# Patient Record
Sex: FEMALE | Race: BLACK OR AFRICAN AMERICAN | NOT HISPANIC OR LATINO | Employment: OTHER | URBAN - METROPOLITAN AREA
[De-identification: names, ages, dates, MRNs, and addresses within clinical notes are randomized per-mention and may not be internally consistent; named-entity substitution may affect disease eponyms.]

---

## 2017-02-22 ENCOUNTER — HISTORICAL (OUTPATIENT)
Dept: RADIOLOGY | Facility: HOSPITAL | Age: 62
End: 2017-02-22

## 2017-06-16 ENCOUNTER — HISTORICAL (OUTPATIENT)
Dept: RADIOLOGY | Facility: HOSPITAL | Age: 62
End: 2017-06-16

## 2017-09-05 ENCOUNTER — HISTORICAL (OUTPATIENT)
Dept: LAB | Facility: HOSPITAL | Age: 62
End: 2017-09-05

## 2017-09-05 LAB
ABS NEUT (OLG): 2.5 X10(3)/MCL (ref 1.5–6.9)
ALBUMIN SERPL-MCNC: 3.6 GM/DL (ref 3.4–5)
ALBUMIN/GLOB SERPL: 0.9 RATIO
ALP SERPL-CCNC: 86 UNIT/L (ref 30–113)
ALT SERPL-CCNC: 34 UNIT/L (ref 10–45)
AST SERPL-CCNC: 26 UNIT/L (ref 15–37)
BILIRUB SERPL-MCNC: 0.7 MG/DL (ref 0.1–0.9)
BILIRUBIN DIRECT+TOT PNL SERPL-MCNC: 0.2 MG/DL (ref 0–0.3)
BILIRUBIN DIRECT+TOT PNL SERPL-MCNC: 0.5 MG/DL
BUN SERPL-MCNC: 28 MG/DL (ref 10–20)
CALCIUM SERPL-MCNC: 9.4 MG/DL (ref 8–10.5)
CHLORIDE SERPL-SCNC: 106 MMOL/L (ref 100–108)
CO2 SERPL-SCNC: 31 MMOL/L (ref 21–35)
CREAT SERPL-MCNC: 1.56 MG/DL (ref 0.7–1.3)
ERYTHROCYTE [DISTWIDTH] IN BLOOD BY AUTOMATED COUNT: 14.6 % (ref 11.5–17)
GLOBULIN SER-MCNC: 4 GM/DL
GLUCOSE SERPL-MCNC: 126 MG/DL (ref 75–116)
HCT VFR BLD AUTO: 38 % (ref 36–48)
HGB BLD-MCNC: 11.8 GM/DL (ref 12–16)
INR PPP: 1 (ref 0–1.2)
MAGNESIUM SERPL-MCNC: 2.3 MG/DL (ref 1.8–2.4)
MCH RBC QN AUTO: 28 PG (ref 27–34)
MCHC RBC AUTO-ENTMCNC: 31 GM/DL (ref 31–36)
MCV RBC AUTO: 91 FL (ref 80–99)
PHOSPHATE SERPL-MCNC: 3 MG/DL (ref 2.6–4.7)
PLATELET # BLD AUTO: 251 X10(3)/MCL (ref 140–400)
PMV BLD AUTO: 9.9 FL (ref 6.8–10)
POTASSIUM SERPL-SCNC: 3.2 MMOL/L (ref 3.6–5.2)
PROT SERPL-MCNC: 7.6 GM/DL (ref 6.4–8.2)
PROTHROMBIN TIME: 11.3 SECOND(S) (ref 9–12)
RBC # BLD AUTO: 4.16 X10(6)/MCL (ref 4.2–5.4)
SODIUM SERPL-SCNC: 144 MMOL/L (ref 135–145)
WBC # SPEC AUTO: 4.1 X10(3)/MCL (ref 4.5–11.5)

## 2017-09-12 ENCOUNTER — HISTORICAL (OUTPATIENT)
Dept: LAB | Facility: HOSPITAL | Age: 62
End: 2017-09-12

## 2017-09-12 LAB
HEMOCCULT SP1 STL QL: NEGATIVE
HEMOCCULT SP2 STL QL: NEGATIVE

## 2017-11-15 ENCOUNTER — HISTORICAL (OUTPATIENT)
Dept: LAB | Facility: HOSPITAL | Age: 62
End: 2017-11-15

## 2017-12-06 ENCOUNTER — HISTORICAL (OUTPATIENT)
Dept: RADIOLOGY | Facility: HOSPITAL | Age: 62
End: 2017-12-06

## 2017-12-11 ENCOUNTER — HISTORICAL (OUTPATIENT)
Dept: RADIOLOGY | Facility: HOSPITAL | Age: 62
End: 2017-12-11

## 2018-01-09 ENCOUNTER — HISTORICAL (OUTPATIENT)
Dept: LAB | Facility: HOSPITAL | Age: 63
End: 2018-01-09

## 2018-05-07 ENCOUNTER — HISTORICAL (OUTPATIENT)
Dept: RADIOLOGY | Facility: HOSPITAL | Age: 63
End: 2018-05-07

## 2018-05-16 ENCOUNTER — HISTORICAL (OUTPATIENT)
Dept: RADIOLOGY | Facility: HOSPITAL | Age: 63
End: 2018-05-16

## 2018-08-29 ENCOUNTER — HISTORICAL (OUTPATIENT)
Dept: LAB | Facility: HOSPITAL | Age: 63
End: 2018-08-29

## 2018-09-19 ENCOUNTER — HISTORICAL (OUTPATIENT)
Dept: RADIOLOGY | Facility: HOSPITAL | Age: 63
End: 2018-09-19

## 2018-12-19 ENCOUNTER — HISTORICAL (OUTPATIENT)
Dept: LAB | Facility: HOSPITAL | Age: 63
End: 2018-12-19

## 2019-02-26 ENCOUNTER — HISTORICAL (OUTPATIENT)
Dept: LAB | Facility: HOSPITAL | Age: 64
End: 2019-02-26

## 2019-03-15 ENCOUNTER — HISTORICAL (OUTPATIENT)
Dept: RADIOLOGY | Facility: HOSPITAL | Age: 64
End: 2019-03-15

## 2019-08-23 ENCOUNTER — HISTORICAL (OUTPATIENT)
Dept: LAB | Facility: HOSPITAL | Age: 64
End: 2019-08-23

## 2021-06-25 ENCOUNTER — HISTORICAL (OUTPATIENT)
Dept: LAB | Facility: HOSPITAL | Age: 66
End: 2021-06-25

## 2022-08-22 ENCOUNTER — HOSPITAL ENCOUNTER (EMERGENCY)
Facility: HOSPITAL | Age: 67
Discharge: HOME | End: 2022-08-22
Attending: INTERNAL MEDICINE
Payer: MEDICARE

## 2022-08-22 VITALS
HEART RATE: 81 BPM | TEMPERATURE: 98 F | SYSTOLIC BLOOD PRESSURE: 165 MMHG | WEIGHT: 250 LBS | RESPIRATION RATE: 18 BRPM | DIASTOLIC BLOOD PRESSURE: 78 MMHG | HEIGHT: 66 IN | OXYGEN SATURATION: 98 % | BODY MASS INDEX: 40.18 KG/M2

## 2022-08-22 DIAGNOSIS — S39.012A BACK STRAIN, INITIAL ENCOUNTER: ICD-10-CM
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
## 2022-08-22 PROCEDURE — 99284 EMERGENCY DEPT VISIT MOD MDM: CPT | Mod: 25

## 2022-08-22 PROCEDURE — 63600175 PHARM REV CODE 636 W HCPCS: Performed by: NURSE PRACTITIONER

## 2022-08-22 PROCEDURE — 25000003 PHARM REV CODE 250: Performed by: NURSE PRACTITIONER

## 2022-08-22 PROCEDURE — 96372 THER/PROPH/DIAG INJ SC/IM: CPT | Performed by: NURSE PRACTITIONER

## 2022-08-22 RX ORDER — CYCLOBENZAPRINE HCL 5 MG
5 TABLET ORAL
Status: COMPLETED | OUTPATIENT
Start: 2022-08-22 | End: 2022-08-22

## 2022-08-22 RX ORDER — CYCLOBENZAPRINE HCL 10 MG
10 TABLET ORAL 3 TIMES DAILY PRN
Qty: 15 TABLET | Refills: 0 | Status: SHIPPED | OUTPATIENT
Start: 2022-08-22 | End: 2022-08-27

## 2022-08-22 RX ORDER — KETOROLAC TROMETHAMINE 30 MG/ML
30 INJECTION, SOLUTION INTRAMUSCULAR; INTRAVENOUS
Status: COMPLETED | OUTPATIENT
Start: 2022-08-22 | End: 2022-08-22

## 2022-08-22 RX ORDER — KETOROLAC TROMETHAMINE 10 MG/1
10 TABLET, FILM COATED ORAL 3 TIMES DAILY
Qty: 15 TABLET | Refills: 0 | Status: SHIPPED | OUTPATIENT
Start: 2022-08-22 | End: 2022-08-27

## 2022-08-22 RX ADMIN — CYCLOBENZAPRINE HYDROCHLORIDE 5 MG: 5 TABLET, FILM COATED ORAL at 08:08

## 2022-08-22 RX ADMIN — KETOROLAC TROMETHAMINE 30 MG: 30 INJECTION, SOLUTION INTRAMUSCULAR; INTRAVENOUS at 08:08
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
## 2022-12-30 ENCOUNTER — LAB VISIT (OUTPATIENT)
Dept: LAB | Facility: HOSPITAL | Age: 67
End: 2022-12-30
Attending: FAMILY MEDICINE
Payer: COMMERCIAL

## 2022-12-30 DIAGNOSIS — E11.22 TYPE 2 DIABETES MELLITUS WITH END-STAGE RENAL DISEASE: Primary | ICD-10-CM

## 2022-12-30 DIAGNOSIS — N18.6 TYPE 2 DIABETES MELLITUS WITH END-STAGE RENAL DISEASE: Primary | ICD-10-CM

## 2022-12-30 LAB
ALBUMIN SERPL-MCNC: 3.8 G/DL (ref 3.4–4.8)
ALBUMIN/GLOB SERPL: 1.1 RATIO (ref 1.1–2)
ALP SERPL-CCNC: 100 UNIT/L (ref 40–150)
ALT SERPL-CCNC: 23 UNIT/L (ref 0–55)
AST SERPL-CCNC: 16 UNIT/L (ref 5–34)
BILIRUBIN DIRECT+TOT PNL SERPL-MCNC: 0.9 MG/DL
BUN SERPL-MCNC: 21 MG/DL (ref 9.8–20.1)
CALCIUM SERPL-MCNC: 9.8 MG/DL (ref 8.4–10.2)
CHLORIDE SERPL-SCNC: 107 MMOL/L (ref 98–107)
CHOLEST SERPL-MCNC: 174 MG/DL
CHOLEST/HDLC SERPL: 3 {RATIO} (ref 0–5)
CO2 SERPL-SCNC: 25 MMOL/L (ref 23–31)
CREAT SERPL-MCNC: 1.25 MG/DL (ref 0.55–1.02)
CREAT UR-MCNC: 125.3 MG/DL (ref 47–110)
EST. AVERAGE GLUCOSE BLD GHB EST-MCNC: 182.9 MG/DL
GFR SERPLBLD CREATININE-BSD FMLA CKD-EPI: 48 MLS/MIN/1.73/M2
GLOBULIN SER-MCNC: 3.6 GM/DL (ref 2.4–3.5)
GLUCOSE SERPL-MCNC: 286 MG/DL (ref 82–115)
HBA1C MFR BLD: 8 %
HDLC SERPL-MCNC: 56 MG/DL (ref 35–60)
LDLC SERPL CALC-MCNC: 96 MG/DL (ref 50–140)
MICROALBUMIN UR-MCNC: 45.9 UG/ML
MICROALBUMIN/CREAT RATIO PNL UR: 36.6 MG/GM CR (ref 0–30)
POTASSIUM SERPL-SCNC: 3.7 MMOL/L (ref 3.5–5.1)
PROT SERPL-MCNC: 7.4 GM/DL (ref 5.8–7.6)
SODIUM SERPL-SCNC: 140 MMOL/L (ref 136–145)
TRIGL SERPL-MCNC: 109 MG/DL (ref 37–140)
VLDLC SERPL CALC-MCNC: 22 MG/DL

## 2022-12-30 PROCEDURE — 80053 COMPREHEN METABOLIC PANEL: CPT

## 2022-12-30 PROCEDURE — 83036 HEMOGLOBIN GLYCOSYLATED A1C: CPT

## 2022-12-30 PROCEDURE — 36415 COLL VENOUS BLD VENIPUNCTURE: CPT

## 2022-12-30 PROCEDURE — 82043 UR ALBUMIN QUANTITATIVE: CPT

## 2022-12-30 PROCEDURE — 80061 LIPID PANEL: CPT

## 2023-01-10 ENCOUNTER — HOSPITAL ENCOUNTER (OUTPATIENT)
Dept: RADIOLOGY | Facility: HOSPITAL | Age: 68
Discharge: HOME | End: 2023-01-10
Attending: FAMILY MEDICINE
Payer: COMMERCIAL

## 2023-01-10 DIAGNOSIS — Z12.31 ENCOUNTER FOR SCREENING MAMMOGRAM FOR BREAST CANCER: ICD-10-CM

## 2023-01-10 PROCEDURE — 77067 MAMMO DIGITAL SCREENING BILAT WITH TOMO: ICD-10-PCS | Mod: 26,,, | Performed by: STUDENT IN AN ORGANIZED HEALTH CARE EDUCATION/TRAINING PROGRAM

## 2023-01-10 PROCEDURE — 77063 MAMMO DIGITAL SCREENING BILAT WITH TOMO: ICD-10-PCS | Mod: 26,,, | Performed by: STUDENT IN AN ORGANIZED HEALTH CARE EDUCATION/TRAINING PROGRAM

## 2023-01-10 PROCEDURE — 77067 SCR MAMMO BI INCL CAD: CPT | Mod: TC

## 2023-01-10 PROCEDURE — 77067 SCR MAMMO BI INCL CAD: CPT | Mod: 26,,, | Performed by: STUDENT IN AN ORGANIZED HEALTH CARE EDUCATION/TRAINING PROGRAM

## 2023-01-10 PROCEDURE — 77063 BREAST TOMOSYNTHESIS BI: CPT | Mod: 26,,, | Performed by: STUDENT IN AN ORGANIZED HEALTH CARE EDUCATION/TRAINING PROGRAM

## 2023-01-17 ENCOUNTER — TELEPHONE (OUTPATIENT)
Dept: GASTROENTEROLOGY | Facility: CLINIC | Age: 68
End: 2023-01-17
Payer: COMMERCIAL

## 2023-01-17 NOTE — TELEPHONE ENCOUNTER
----- Message from Alia Means RN sent at 1/12/2023  3:23 PM CST -----  Contact: self    ----- Message -----  From: Melody John  Sent: 1/12/2023   2:18 PM CST  To: Janie Ashford Staff    Pt wants to schedule appt, via was just diagnosed with having stomach issues and needs to set up treatment pls call [phone]

## 2023-03-29 ENCOUNTER — HOSPITAL ENCOUNTER (EMERGENCY)
Facility: HOSPITAL | Age: 68
Discharge: HOME | End: 2023-03-29
Attending: INTERNAL MEDICINE
Payer: MEDICARE

## 2023-03-29 VITALS
TEMPERATURE: 98 F | BODY MASS INDEX: 41.14 KG/M2 | RESPIRATION RATE: 16 BRPM | SYSTOLIC BLOOD PRESSURE: 160 MMHG | HEART RATE: 80 BPM | WEIGHT: 254.88 LBS | OXYGEN SATURATION: 99 % | DIASTOLIC BLOOD PRESSURE: 85 MMHG

## 2023-03-29 DIAGNOSIS — M25.552 LEFT HIP PAIN: Primary | ICD-10-CM

## 2023-03-29 PROCEDURE — 99284 EMERGENCY DEPT VISIT MOD MDM: CPT

## 2023-03-29 PROCEDURE — 25000003 PHARM REV CODE 250: Performed by: PHYSICIAN ASSISTANT

## 2023-03-29 RX ORDER — DICLOFENAC SODIUM 50 MG/1
50 TABLET, DELAYED RELEASE ORAL 3 TIMES DAILY
Qty: 21 TABLET | Refills: 0 | Status: SHIPPED | OUTPATIENT
Start: 2023-03-29 | End: 2023-04-05

## 2023-03-29 RX ORDER — HYDROCODONE BITARTRATE AND ACETAMINOPHEN 5; 325 MG/1; MG/1
1 TABLET ORAL EVERY 6 HOURS PRN
Qty: 16 TABLET | Refills: 0 | Status: SHIPPED | OUTPATIENT
Start: 2023-03-29 | End: 2023-04-02

## 2023-03-29 RX ORDER — METHOCARBAMOL 500 MG/1
1000 TABLET, FILM COATED ORAL 3 TIMES DAILY
Qty: 42 TABLET | Refills: 0 | Status: SHIPPED | OUTPATIENT
Start: 2023-03-29 | End: 2023-04-05

## 2023-03-29 RX ORDER — HYDROCODONE BITARTRATE AND ACETAMINOPHEN 5; 325 MG/1; MG/1
1 TABLET ORAL
Status: COMPLETED | OUTPATIENT
Start: 2023-03-29 | End: 2023-03-29

## 2023-03-29 RX ORDER — METHOCARBAMOL 500 MG/1
1000 TABLET, FILM COATED ORAL
Status: COMPLETED | OUTPATIENT
Start: 2023-03-29 | End: 2023-03-29

## 2023-03-29 RX ADMIN — HYDROCODONE BITARTRATE AND ACETAMINOPHEN 1 TABLET: 5; 325 TABLET ORAL at 08:03

## 2023-03-29 RX ADMIN — METHOCARBAMOL 500 MG: 500 TABLET ORAL at 08:03

## 2023-03-30 NOTE — DISCHARGE INSTRUCTIONS
You have been prescribed Diclofenac for pain. This is an Non-Steroidal Anti-Inflammatory (NSAID) Medication. Please do not take any additional NSAIDs while you are taking this medication including (Advil, Aleve, Motrin, Ibuprofen, Mobic\meloxicam, Naprosyn, Toradol, ketoralac, etc.). Please stop taking this medication if you experience: weakness, itching, yellow skin or eyes, joint pains, vomiting blood, blood or black stools, unusual weight gain, or swelling in your arms, legs, hands, or feet.     You have been prescribed Norco (Hydrocodone) for pain and Robaxin (Methocarbamol) for muscle spasms/pain. Please do not take this medication while working, drinking alcohol, swimming, or while driving/operating heavy machinery. This medication may cause drowsiness, dizziness, impair judgment, and reduce physical capabilities.You should not drive, operate heavy machinery, or make life changing decisions while taking this medication.      This medication contains Tylenol. Please do not take any additional Tylenol while you are taking this medication.     While in the Emergency Department you received medication that may cause drowsiness, dizziness, impaired judgment, and reduced physical capabilities. You should not drive, operate heavy machinery, swim, or make life  changing decisions within 24 hours of receiving this medication.

## 2023-03-30 NOTE — ED PROVIDER NOTES
Encounter Date: 3/29/2023       History     Chief Complaint   Patient presents with    Hip Pain     Pt states began on 3/25/23 with L hip pain after turning too fast. No meds taken PTA     67-year-old female presents to ED for evaluation of left hip pain.  Patient reports that she was standing when she turned very quickly causing pain in her left hip.  Reports pain starting on 03/25/2023.  Reports pain worse with movement.  Denies any fall or injury.  States that she took 1 Aleve yesterday without relief.    The history is provided by the patient and the spouse. No  was used.   Review of patient's allergies indicates:   Allergen Reactions    Codeine Itching and Rash    Latex Rash     Past Medical History:   Diagnosis Date    Diabetes mellitus     High cholesterol     Hypertension      Past Surgical History:   Procedure Laterality Date    CHOLECYSTECTOMY      KNEE SURGERY       History reviewed. No pertinent family history.  Social History     Tobacco Use    Smoking status: Never     Review of Systems   Constitutional:  Negative for chills, fatigue and fever.   Respiratory:  Negative for shortness of breath.    Cardiovascular:  Negative for chest pain.   Gastrointestinal:  Negative for abdominal pain, diarrhea, nausea and vomiting.   Genitourinary:  Negative for dysuria, flank pain, frequency and urgency.   Musculoskeletal:  Positive for arthralgias, joint swelling and myalgias. Negative for back pain.   All other systems reviewed and are negative.    Physical Exam     Initial Vitals [03/29/23 1919]   BP Pulse Resp Temp SpO2   (!) 203/105 80 18 99.2 °F (37.3 °C) 98 %      MAP       --         Physical Exam    Vitals reviewed.  Constitutional: She appears well-developed.   HENT:   Head: Normocephalic and atraumatic.   Right Ear: Tympanic membrane and external ear normal.   Left Ear: Tympanic membrane and external ear normal.   Mouth/Throat: Uvula is midline, oropharynx is clear and moist and mucous  membranes are normal. No trismus in the jaw. No uvula swelling. No oropharyngeal exudate, posterior oropharyngeal edema or posterior oropharyngeal erythema.   Eyes: Conjunctivae are normal. Pupils are equal, round, and reactive to light.   Neck: Neck supple.   Normal range of motion.  Cardiovascular:  Normal rate, regular rhythm and normal heart sounds.           Pulmonary/Chest: Breath sounds normal. She has no wheezes. She has no rhonchi. She has no rales.   Abdominal: Abdomen is soft. Bowel sounds are normal. There is no abdominal tenderness.   Musculoskeletal:         General: Normal range of motion.      Cervical back: Normal range of motion and neck supple.      Left hip: Tenderness present. No deformity or bony tenderness. Normal range of motion.        Legs:      Neurological: She is alert and oriented to person, place, and time. She has normal strength. No cranial nerve deficit or sensory deficit. GCS score is 15. GCS eye subscore is 4. GCS verbal subscore is 5. GCS motor subscore is 6.   Skin: Skin is warm and dry.   Psychiatric: She has a normal mood and affect.       ED Course   Procedures  Labs Reviewed - No data to display       Imaging Results              X-Ray Hip 2 or 3 views Left (with Pelvis when performed) (Preliminary result)  Result time 03/29/23 20:45:11      Wet Read by LAVONNE Bond (03/29/23 20:45:11, [facility])    X-ray left hip:  Degenerative changes noted with no acute fracture                                     Medications   methocarbamoL tablet 1,000 mg (1,000 mg Oral Given 3/29/23 2058)   HYDROcodone-acetaminophen 5-325 mg per tablet 1 tablet (1 tablet Oral Given 3/29/23 2058)     Medical Decision Making:   Initial Assessment:   67-year-old female presents to ED for evaluation of left hip pain.  Patient reports that she was standing when she turned very quickly causing pain in her left hip.  Reports pain starting on 03/25/2023.   Reports pain worse with movement.  Denies any fall or injury.  States that she took 1 Aleve yesterday without relief.  Differential Diagnosis:   Musculoskeletal pain, hip pain, strain, strain, sciatica, arthritis  Independently Interpreted Test(s):   I have ordered and independently interpreted X-rays - see prior notes.  Clinical Tests:   Radiological Study: Ordered and Reviewed  ED Management:  Patient presents to ED for evaluation of left hip pain following twisting several days ago.  Patient reports pain worse with movement.  Reports unable to stand due to pain.  X-ray obtained showing no acute findings with  changes.  Discussed symptomatic care.  Will discharge home with short course of NSAIDs, Norco and muscle relaxer.  Discussed side effects including making her sleepy, dizzy and more relaxed.  Advised her not to drive while taking these medications.  Return ED precautions given.  Patient verbalizes understanding and agrees with plan of care.                        Clinical Impression:   Final diagnoses:  [M25.552] Left hip pain (Primary)        ED Disposition Condition    Discharge Stable          ED Prescriptions       Medication Sig Dispense Start Date End Date Auth. Provider    methocarbamoL (ROBAXIN) 500 MG Tab Take 2 tablets (1,000 mg total) by mouth 3 (three) times daily. for 7 days 42 tablet 3/29/2023 4/5/2023 LAVONNE Bond    diclofenac (VOLTAREN) 50 MG EC tablet Take 1 tablet (50 mg total) by mouth 3 (three) times daily. for 7 days 21 tablet 3/29/2023 4/5/2023 LAVONNE Bond    HYDROcodone-acetaminophen (NORCO) 5-325 mg per tablet Take 1 tablet by mouth every 6 (six) hours as needed for Pain. 16 tablet 3/29/2023 4/2/2023 LAVONNE Bond          Follow-up Information       Follow up With Specialties Details Why Contact Info    Duong Tim MD Family Medicine Call  As needed [address] [place] [address] [phone]               LAVONNE Bond  03/29/23 2100

## 2023-04-10 ENCOUNTER — LAB VISIT (OUTPATIENT)
Dept: LAB | Facility: HOSPITAL | Age: 68
End: 2023-04-10
Attending: FAMILY MEDICINE
Payer: COMMERCIAL

## 2023-04-10 DIAGNOSIS — E11.69 DIABETES MELLITUS ASSOCIATED WITH HORMONAL ETIOLOGY: Primary | ICD-10-CM

## 2023-04-10 LAB
EST. AVERAGE GLUCOSE BLD GHB EST-MCNC: 168.6 MG/DL
HBA1C MFR BLD: 7.5 %

## 2023-04-10 PROCEDURE — 83036 HEMOGLOBIN GLYCOSYLATED A1C: CPT

## 2023-04-10 PROCEDURE — 36415 COLL VENOUS BLD VENIPUNCTURE: CPT

## 2023-04-27 ENCOUNTER — OFFICE VISIT (OUTPATIENT)
Dept: GASTROENTEROLOGY | Facility: CLINIC | Age: 68
End: 2023-04-27
Payer: COMMERCIAL

## 2023-04-27 VITALS
OXYGEN SATURATION: 96 % | BODY MASS INDEX: 40.98 KG/M2 | HEIGHT: 66 IN | SYSTOLIC BLOOD PRESSURE: 152 MMHG | DIASTOLIC BLOOD PRESSURE: 89 MMHG | WEIGHT: 255 LBS | HEART RATE: 74 BPM

## 2023-04-27 DIAGNOSIS — K21.9 GASTROESOPHAGEAL REFLUX DISEASE, UNSPECIFIED WHETHER ESOPHAGITIS PRESENT: Primary | ICD-10-CM

## 2023-04-27 DIAGNOSIS — R10.13 EPIGASTRIC PAIN: ICD-10-CM

## 2023-04-27 DIAGNOSIS — R14.0 BLOATING: ICD-10-CM

## 2023-04-27 DIAGNOSIS — Z12.11 SCREENING FOR MALIGNANT NEOPLASM OF COLON: ICD-10-CM

## 2023-04-27 PROCEDURE — 99204 OFFICE O/P NEW MOD 45 MIN: CPT | Mod: S$GLB,,, | Performed by: INTERNAL MEDICINE

## 2023-04-27 PROCEDURE — 99204 PR OFFICE/OUTPT VISIT, NEW, LEVL IV, 45-59 MIN: ICD-10-PCS | Mod: S$GLB,,, | Performed by: INTERNAL MEDICINE

## 2023-04-27 RX ORDER — IRBESARTAN 300 MG/1
300 TABLET ORAL DAILY
COMMUNITY
Start: 2023-04-11

## 2023-04-27 RX ORDER — FLUTICASONE PROPIONATE AND SALMETEROL 250; 50 UG/1; UG/1
1 POWDER RESPIRATORY (INHALATION) DAILY PRN
COMMUNITY

## 2023-04-27 RX ORDER — METFORMIN HYDROCHLORIDE 500 MG/1
1000 TABLET ORAL
COMMUNITY

## 2023-04-27 RX ORDER — ROSUVASTATIN CALCIUM 20 MG/1
20 TABLET, COATED ORAL DAILY
COMMUNITY

## 2023-04-27 RX ORDER — GLIMEPIRIDE 2 MG/1
2 TABLET ORAL
COMMUNITY

## 2023-04-27 RX ORDER — DILTIAZEM HYDROCHLORIDE 300 MG/1
300 CAPSULE, EXTENDED RELEASE ORAL DAILY
COMMUNITY

## 2023-04-27 NOTE — PATIENT INSTRUCTIONS
Schedule EGD   Hold PM dose of Metformin day prior to procedure.     Order Cologuard  Start probiotic- Alminder. This is over the counter.    Please notify my office if you have not been contacted within two weeks after any procedures, submitting any samples (biopsies, blood, stool, urine, etc.) or after any imaging (X-ray, CT, MRI, etc.).

## 2023-04-27 NOTE — PROGRESS NOTES
"Clinic Note    Reason for visit:  The primary encounter diagnosis was Gastroesophageal reflux disease, unspecified whether esophagitis present. Diagnoses of Epigastric pain, Screening for malignant neoplasm of colon, and Bloating were also pertinent to this visit.    PCP: Duong Tim       HPI:  This is a 67 y.o. female who is here to establish care.     Patient with intermittent epigastric pain and reflux for the past several years. Feels as though food "sits" in her stomach. Reports reflux. Does report intermittent bloating  Has had upper endoscopy about 15 years ago. Denies prev. Colonoscopy.  She has had stool tests- believed to be FIT test.  She is a DM but BS will drop easily and does not want Colonoscopy  Denies rectal bleeding      Review of Systems   Constitutional:  Negative for chills, diaphoresis, fatigue, fever and unexpected weight change.   HENT:  Negative for mouth sores, nosebleeds, postnasal drip, sore throat, trouble swallowing and voice change.    Eyes:  Positive for eye dryness. Negative for pain.   Respiratory:  Positive for shortness of breath and wheezing. Negative for apnea, cough, choking and chest tightness.    Cardiovascular:  Negative for chest pain, palpitations, leg swelling and claudication.   Gastrointestinal:  Positive for diarrhea, nausea and reflux. Negative for abdominal distention, abdominal pain, anal bleeding, blood in stool, change in bowel habit, constipation, rectal pain, vomiting, fecal incontinence and change in bowel habit.   Genitourinary:  Negative for bladder incontinence, difficulty urinating, dysuria, flank pain, frequency and hematuria.   Musculoskeletal:  Positive for back pain. Negative for arthralgias, joint swelling and joint deformity.   Integumentary:  Negative for color change, rash and wound.   Allergic/Immunologic: Positive for food allergies. Negative for environmental allergies.   Neurological:  Negative for seizures, facial asymmetry, speech " "difficulty, weakness, headaches and memory loss.   Hematological:  Negative for adenopathy. Does not bruise/bleed easily.   Psychiatric/Behavioral:  Negative for agitation, behavioral problems, confusion, hallucinations and sleep disturbance.       Past Medical History:   Diagnosis Date    BMI 40.0-44.9, adult     Diabetes mellitus     High cholesterol     Hypertension     Mild intermittent asthma, uncomplicated      Past Surgical History:   Procedure Laterality Date    CHOLECYSTECTOMY      KNEE SURGERY Bilateral     bilateral     Family History   Problem Relation Age of Onset    Colon cancer Neg Hx     Liver cancer Neg Hx     Liver disease Neg Hx     Stomach cancer Neg Hx     Pancreatic cancer Neg Hx     Throat cancer Neg Hx     Esophageal cancer Neg Hx     Ulcerative colitis Neg Hx     Crohn's disease Neg Hx      Social History     Tobacco Use    Smoking status: Never    Smokeless tobacco: Never   Substance Use Topics    Alcohol use: Never    Drug use: Never     Review of patient's allergies indicates:   Allergen Reactions    Codeine Itching and Rash    Latex Rash        Medication List with Changes/Refills   Current Medications    DILTIAZEM HCL (TIAZAC) 300 MG 24 HR CAPSULE    Take 300 mg by mouth once daily.    FLUTICASONE-SALMETEROL DISKUS INHALER 250-50 MCG    Inhale 1 puff into the lungs daily as needed. Controller    GLIMEPIRIDE (AMARYL) 2 MG TABLET    Take 2 mg by mouth before breakfast.    IRBESARTAN (AVAPRO) 300 MG TABLET    Take 300 mg by mouth once daily.    METFORMIN (GLUCOPHAGE) 500 MG TABLET    Take 1,000 mg by mouth before evening meal.    ROSUVASTATIN (CRESTOR) 20 MG TABLET    Take 20 mg by mouth once daily.          Vital Signs:  BP (!) 152/89   Pulse 74   Ht 5' 6" (1.676 m)   Wt 115.7 kg (255 lb)   SpO2 96%   BMI 41.16 kg/m²         Physical Exam  Vitals reviewed.   Constitutional:       General: She is awake. She is not in acute distress.     Appearance: Normal appearance. She is " well-developed. She is obese. She is not ill-appearing, toxic-appearing or diaphoretic.   HENT:      Head: Normocephalic and atraumatic.      Nose: Nose normal.      Mouth/Throat:      Mouth: Mucous membranes are moist.      Pharynx: Oropharynx is clear. No oropharyngeal exudate or posterior oropharyngeal erythema.   Eyes:      General: Lids are normal. Gaze aligned appropriately. No scleral icterus.        Right eye: No discharge.         Left eye: No discharge.      Extraocular Movements: Extraocular movements intact.      Conjunctiva/sclera: Conjunctivae normal.   Neck:      Trachea: Trachea normal.   Cardiovascular:      Rate and Rhythm: Normal rate and regular rhythm.      Pulses:           Radial pulses are 2+ on the right side and 2+ on the left side.   Pulmonary:      Effort: Pulmonary effort is normal. No respiratory distress.      Breath sounds: Normal breath sounds. No stridor. No wheezing or rhonchi.   Chest:      Chest wall: No tenderness.   Abdominal:      General: Bowel sounds are normal. There is no distension.      Palpations: Abdomen is soft. There is no fluid wave, hepatomegaly or mass.      Tenderness: There is no abdominal tenderness. There is no guarding or rebound.   Musculoskeletal:         General: No tenderness or deformity.      Cervical back: Full passive range of motion without pain and neck supple. No tenderness.      Right lower leg: No edema.      Left lower leg: No edema.   Lymphadenopathy:      Cervical: No cervical adenopathy.   Skin:     General: Skin is warm and dry.      Capillary Refill: Capillary refill takes less than 2 seconds.      Coloration: Skin is not cyanotic, jaundiced or pale.      Findings: No rash.   Neurological:      General: No focal deficit present.      Mental Status: She is alert and oriented to person, place, and time.      Cranial Nerves: No facial asymmetry.      Motor: No tremor.   Psychiatric:         Attention and Perception: Attention normal.          Mood and Affect: Mood and affect normal.         Speech: Speech normal.         Behavior: Behavior normal. Behavior is cooperative.          All of the data above and below has been reviewed by myself and any further interpretations will be reflected in the assessment and plan.   The data includes review of external notes, and independent interpretation of lab results, procedures, x-rays, and imaging reports.      Assessment:  Gastroesophageal reflux disease, unspecified whether esophagitis present  -     Ambulatory Referral to External Surgery    Epigastric pain  -     Ambulatory Referral to External Surgery    Screening for malignant neoplasm of colon  -     Cologuard Screening (Multitarget Stool DNA); Future; Expected date: 04/27/2023    Bloating    GERD/epigastric pain- will order EGD r/o hernia vs. H.pylori. vs. Stricture  Due for CRC Screening- She refuses colonoscopy. will order cologuard     Recommendations:  Schedule EGD at [facility]  -if negative will consider GES to r/o gastroparesis   Hold PM dose Metformin day prior to procedure.     Recommended Colonoscopy- pt wanted Cologuard  Order Cologuard  Start probiotic- mena Colon health      Risks, benefits, and alternatives of medical management, any associated procedures, and/or treatment discussed with the patient. Patient given opportunity to ask questions and voices understanding. Patient has elected to proceed with the recommended care modalities as discussed.    Follow up in about 6 months (around 10/27/2023).    Order summary:  Orders Placed This Encounter   Procedures    Cologuard Screening (Multitarget Stool DNA)    Ambulatory Referral to External Surgery        Instructed patient to notify my office if they have not been contacted within two weeks after any procedures, submitting any samples (biopsies, blood, stool, urine, etc.) or after any imaging (X-ray, CT, MRI, etc.).     Dejon Lima MD    This document may have been created using a voice  recognition transcribing system. Incorrect words or phrases may have been missed during proofreading. Please interpret accordingly or contact me for clarification.

## 2023-05-04 ENCOUNTER — TELEPHONE (OUTPATIENT)
Dept: GASTROENTEROLOGY | Facility: CLINIC | Age: 68
End: 2023-05-04

## 2023-05-04 NOTE — TELEPHONE ENCOUNTER
----- Message from Nishi Bustillo sent at 5/4/2023  2:47 PM CDT -----  Contact: Chyna Gunnha needs a call back at [phone], Regards to her upcoming test she needs to have done.    Thanks  Td

## 2023-05-25 ENCOUNTER — TELEPHONE (OUTPATIENT)
Dept: GASTROENTEROLOGY | Facility: CLINIC | Age: 68
End: 2023-05-25
Payer: COMMERCIAL

## 2023-05-25 DIAGNOSIS — R10.13 EPIGASTRIC PAIN: Primary | ICD-10-CM

## 2023-05-25 DIAGNOSIS — R14.0 BLOATING: ICD-10-CM

## 2023-05-25 NOTE — TELEPHONE ENCOUNTER
----- Message from Octavia Rojas sent at 5/25/2023 12:35 PM CDT -----  Contact: Patient  Patient called to consult with nurse or staff regarding her visit with Dr. Lima. She states she was supposed to do testing but hasn't been contacted. She would like call back and can be reached at [phone]. Thanks/MR

## 2023-05-30 ENCOUNTER — TELEPHONE (OUTPATIENT)
Dept: GASTROENTEROLOGY | Facility: CLINIC | Age: 68
End: 2023-05-30
Payer: COMMERCIAL

## 2023-05-30 NOTE — TELEPHONE ENCOUNTER
Pt called and informed . She will await a call from scheduling at [facility].

## 2023-06-27 DIAGNOSIS — R10.13 EPIGASTRIC PAIN: ICD-10-CM

## 2023-06-27 DIAGNOSIS — K21.9 GASTROESOPHAGEAL REFLUX DISEASE, UNSPECIFIED WHETHER ESOPHAGITIS PRESENT: Primary | ICD-10-CM

## 2023-06-27 NOTE — PROGRESS NOTES
"[place] - Gastroenterology  [address] [place] [address]  Phone: [phone]  Fax: [phone]    History & Physical         Provider: Dr. Dejon Lima    Patient Name: Chyna MONK (age):1955  67 y.o.           Gender: female   Phone: [phone]     Referring Physician: Duong Tim     Vital Signs:   Height - 5' 6"  Weight - 255 lbs  BMI -  41.2    Plan: EGD    Encounter Diagnoses   Name Primary?    Gastroesophageal reflux disease, unspecified whether esophagitis present Yes    Epigastric pain            History:      Past Medical History:   Diagnosis Date    BMI 40.0-44.9, adult     Diabetes mellitus     High cholesterol     Hypertension     Mild intermittent asthma, uncomplicated       Past Surgical History:   Procedure Laterality Date    CHOLECYSTECTOMY      KNEE SURGERY Bilateral     bilateral      Medication List with Changes/Refills   Current Medications    DILTIAZEM HCL (TIAZAC) 300 MG 24 HR CAPSULE    Take 300 mg by mouth once daily.    FLUTICASONE-SALMETEROL DISKUS INHALER 250-50 MCG    Inhale 1 puff into the lungs daily as needed. Controller    GLIMEPIRIDE (AMARYL) 2 MG TABLET    Take 2 mg by mouth before breakfast.    IRBESARTAN (AVAPRO) 300 MG TABLET    Take 300 mg by mouth once daily.    METFORMIN (GLUCOPHAGE) 500 MG TABLET    Take 1,000 mg by mouth before evening meal.    ROSUVASTATIN (CRESTOR) 20 MG TABLET    Take 20 mg by mouth once daily.      Review of patient's allergies indicates:   Allergen Reactions    Codeine Itching and Rash    Latex Rash      Family History   Problem Relation Age of Onset    Colon cancer Neg Hx     Liver cancer Neg Hx     Liver disease Neg Hx     Stomach cancer Neg Hx     Pancreatic cancer Neg Hx     Throat cancer Neg Hx     Esophageal cancer Neg Hx     Ulcerative colitis Neg Hx     Crohn's disease Neg Hx       Social History     Tobacco Use    Smoking " status: Never    Smokeless tobacco: Never   Substance Use Topics    Alcohol use: Never    Drug use: Never        Physical Examination:     General Appearance:___________________________  HEENT: _____________________________________  Abdomen:____________________________________  Heart:________________________________________  Lungs:_______________________________________  Extremities:___________________________________  Skin:_________________________________________  Endocrine:____________________________________  Genitourinary:_________________________________  Neurological:__________________________________      Patient has been evaluated immediately prior to sedation and is medically cleared for endoscopy with IVCS as an ASA class: ______      Physician Signature: _________________________       Date: ________  Time: ________

## 2023-07-05 ENCOUNTER — TELEPHONE (OUTPATIENT)
Dept: GASTROENTEROLOGY | Facility: CLINIC | Age: 68
End: 2023-07-05
Payer: COMMERCIAL

## 2023-07-05 NOTE — TELEPHONE ENCOUNTER
----- Message from Octavia Rojas sent at 7/5/2023  3:29 PM CDT -----  Contact: Patient  Patient called to consult with nurse or staff regarding a question she has for the clinic. She wanted to speak with nurse if possible and can be reached at [phone]. Thanks/

## 2023-07-11 ENCOUNTER — TELEPHONE (OUTPATIENT)
Dept: GASTROENTEROLOGY | Facility: CLINIC | Age: 68
End: 2023-07-11

## 2023-07-11 ENCOUNTER — TELEPHONE (OUTPATIENT)
Dept: GASTROENTEROLOGY | Facility: CLINIC | Age: 68
End: 2023-07-11
Payer: COMMERCIAL

## 2023-07-11 DIAGNOSIS — K21.9 GASTROESOPHAGEAL REFLUX DISEASE, UNSPECIFIED WHETHER ESOPHAGITIS PRESENT: Primary | ICD-10-CM

## 2023-07-11 DIAGNOSIS — R10.13 EPIGASTRIC PAIN: ICD-10-CM

## 2023-07-11 DIAGNOSIS — R14.0 BLOATING: ICD-10-CM

## 2023-07-11 NOTE — TELEPHONE ENCOUNTER
"I contacted patient to see what is her confusion or her concern to warrant the need to cancel her procedure. Patient states she has a very small throat and in order for an EGD to be done, she would have to have a smaller instrument used on her. Patient states she does not want to take the risk of having an issue happen to her while having an EGD and she  does not want to risk having to get a feeding tube due to complication during EGD. Patient states when she previously spoke with Dr. Lima he mentioned to her that there is an "eating test" and a "drinking test" that she can take instead of the EGD that will tell him the same answers as if he did an EGD. Patient is wanting to know if she proceed with those tests? Patient also states that she received the cologuard test and she is going to be doing that this week. Patient states her stool are currently too watery for her to perform the cologuard and that she has been told its better to do the cologuard when your stools are more firm. I informed patient we will send Dr. Lima a message and see what his recommendation is and will call her back to let her know. BF  "

## 2023-07-13 ENCOUNTER — TELEPHONE (OUTPATIENT)
Dept: GASTROENTEROLOGY | Facility: CLINIC | Age: 68
End: 2023-07-13

## 2023-07-13 NOTE — TELEPHONE ENCOUNTER
----- Message from Nishi Bustillo sent at 7/13/2023  2:48 PM CDT -----  Contact: Chyna Clemente missed a call would like another call back at [phone].    Thank  Td

## 2023-07-13 NOTE — TELEPHONE ENCOUNTER
----- Message from Brunilda Wolfe sent at 7/13/2023  3:27 PM CDT -----  Patient is requesting a call back at [phone]

## 2023-07-13 NOTE — TELEPHONE ENCOUNTER
Tried to call patient. No answer left VM. Ordering and upper GI X ray per Dr. Lima. Order signed.  VERONICA

## 2023-07-18 ENCOUNTER — TELEPHONE (OUTPATIENT)
Dept: GASTROENTEROLOGY | Facility: CLINIC | Age: 68
End: 2023-07-18
Payer: COMMERCIAL

## 2023-07-18 NOTE — TELEPHONE ENCOUNTER
----- Message from Gabbi Adams sent at 7/18/2023  4:20 PM CDT -----  Regarding: medical info  Contact: PT  PT states that she has to get an upper GI taken, she is wanting to know exactly what the procedure is and if it can be done in another [facility] location, return call [phone].

## 2023-07-18 NOTE — TELEPHONE ENCOUNTER
Patient wants someone to call her and tell her exactly what takes place during an Upper GI Xray. She will not schedule anything until this is done. Patient will not have it done in [place], only wants it done in [place] at the [facility] there. I told the patient that I am not fully aware of exactly what goes on during an upper gi xray and can not walk her through the steps. Patient believes this to be a waste of time. -KG

## 2023-07-19 NOTE — TELEPHONE ENCOUNTER
Spoke with patient. Gave her instructions on upper GI Xray- she now agrees. Wants to be scheduled.   Can order be faxed (not sure if it is already done but has has not been scheduled yet).  VERONICA

## 2023-09-04 LAB — NONINV COLON CA DNA+OCC BLD SCRN STL QL: NEGATIVE

## 2023-09-06 ENCOUNTER — TELEPHONE (OUTPATIENT)
Dept: GASTROENTEROLOGY | Facility: CLINIC | Age: 68
End: 2023-09-06
Payer: COMMERCIAL

## 2023-09-06 NOTE — TELEPHONE ENCOUNTER
----- Message from Dejon Lima MD sent at 9/6/2023  2:04 PM CDT -----  Call with results, COLOGUARD negative-repeat cologuard in 3 yrs.

## 2023-09-06 NOTE — TELEPHONE ENCOUNTER
Results discussed with patient per Dr. Lima's chart remarks. Patient voices understanding/agreement. Refaxed GES order. -Sakina MOULTON CMA

## 2023-09-29 ENCOUNTER — TELEPHONE (OUTPATIENT)
Dept: GASTROENTEROLOGY | Facility: CLINIC | Age: 68
End: 2023-09-29
Payer: COMMERCIAL

## 2023-09-29 NOTE — TELEPHONE ENCOUNTER
Chyna called regarding her GES study, she said [facility] in [place] never called her to schedule.  The order was faxed twice from our office.   Patient said now she changed her mind and will have it done in [place] at [facility].

## 2023-10-06 ENCOUNTER — TELEPHONE (OUTPATIENT)
Dept: GASTROENTEROLOGY | Facility: CLINIC | Age: 68
End: 2023-10-06
Payer: COMMERCIAL

## 2023-10-06 NOTE — TELEPHONE ENCOUNTER
----- Message from Brunilda Wolfe sent at 10/6/2023  2:52 PM CDT -----  Patient is calling in regards to still waiting to be scheduled...Please call her back at [phone].          Thanks  joie

## 2023-10-11 ENCOUNTER — TELEPHONE (OUTPATIENT)
Dept: GASTROENTEROLOGY | Facility: CLINIC | Age: 68
End: 2023-10-11
Payer: COMMERCIAL

## 2023-10-11 NOTE — TELEPHONE ENCOUNTER
----- Message from Kim Knutson sent at 10/11/2023  3:04 PM CDT -----  Contact: self  Type: Staff Message  Caller: Chyna Montiel  Call Back Number: [phone]  Nature of the Call: patient is wanting to speak with Dr. Carlson  Additional Information: na

## 2023-10-19 ENCOUNTER — TELEPHONE (OUTPATIENT)
Dept: GASTROENTEROLOGY | Facility: CLINIC | Age: 68
End: 2023-10-19
Payer: COMMERCIAL

## 2023-10-19 NOTE — TELEPHONE ENCOUNTER
----- Message from Malaika Cope sent at 10/19/2023 12:01 PM CDT -----  Type:  Needs Medical Advice    Who Called: .Chyna Montiel  Symptoms (please be specific): -   How long has patient had these symptoms:  -  Pharmacy name and phone #:  -  Would the patient rather a call back or a response via [facility]?    Best Call Back Number: [phone]  Additional Information: Pt needs to speak w/nurse to explain what procedure she is scheduled to have on Nov 11th please call pt ( she's saying no one has called her to explain this to her )

## 2023-10-30 ENCOUNTER — OFFICE VISIT (OUTPATIENT)
Dept: GASTROENTEROLOGY | Facility: CLINIC | Age: 68
End: 2023-10-30
Payer: COMMERCIAL

## 2023-10-30 VITALS
WEIGHT: 252 LBS | SYSTOLIC BLOOD PRESSURE: 149 MMHG | BODY MASS INDEX: 40.5 KG/M2 | HEIGHT: 66 IN | DIASTOLIC BLOOD PRESSURE: 80 MMHG | OXYGEN SATURATION: 95 % | HEART RATE: 87 BPM

## 2023-10-30 DIAGNOSIS — K59.01 CONSTIPATION BY DELAYED COLONIC TRANSIT: ICD-10-CM

## 2023-10-30 DIAGNOSIS — R14.0 BLOATING: ICD-10-CM

## 2023-10-30 DIAGNOSIS — R10.13 EPIGASTRIC PAIN: ICD-10-CM

## 2023-10-30 DIAGNOSIS — K21.9 GASTROESOPHAGEAL REFLUX DISEASE, UNSPECIFIED WHETHER ESOPHAGITIS PRESENT: Primary | ICD-10-CM

## 2023-10-30 PROCEDURE — 99214 PR OFFICE/OUTPT VISIT, EST, LEVL IV, 30-39 MIN: ICD-10-PCS | Mod: S$GLB,,, | Performed by: INTERNAL MEDICINE

## 2023-10-30 PROCEDURE — 99214 OFFICE O/P EST MOD 30 MIN: CPT | Mod: S$GLB,,, | Performed by: INTERNAL MEDICINE

## 2023-10-30 NOTE — LETTER
October 30, 2023        Duong Tim MD  [address] [place] [address]             [facility]  [address] [place] [address]  Phone: [phone]  Fax: [phone]   Patient: Chyna Montiel   MR Number: [id number]   YOB: 1955   Date of Visit: 10/30/2023       Dear Dr. Tim:    Thank you for referring Chyna Montiel to me for evaluation. Attached you will find relevant portions of my assessment and plan of care.    If you have questions, please do not hesitate to call me. I look forward to following Chyna Montiel along with you.    Sincerely,      Dejon Lima MD            CC  No Recipients    Enclosure

## 2023-10-30 NOTE — PROGRESS NOTES
Clinic Note    Reason for visit:  The primary encounter diagnosis was Gastroesophageal reflux disease, unspecified whether esophagitis present. Diagnoses of Epigastric pain, Bloating, and Constipation by delayed colonic transit were also pertinent to this visit.    PCP: Duong Tim       HPI:  This is a 68 y.o. female here for follow up. Patient with epigastric pain. EGD was cancelled by patient. UGIS and GES was ordered and sent to [facility] in [place] but never completed. She would like to know how the UGIS and GES is completed. She had gallbladder out many years ago. 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 She states she has a lot of regurgitation lately. She is diabetic. She avoids dairy products. She also has constipation. May go 2-3 days without BM.     9/2023 COLOGUARD negative-repeat cologuard in 3 yrs.    Review of Systems   Constitutional:  Negative for fatigue, fever and unexpected weight change.   HENT:  Negative for mouth sores, postnasal drip, sore throat and trouble swallowing.    Eyes:  Negative for pain, discharge and eye dryness.   Respiratory:  Positive for cough, chest tightness, shortness of breath and wheezing. Negative for apnea and choking.    Cardiovascular:  Negative for chest pain, palpitations and leg swelling.   Gastrointestinal:  Negative for abdominal distention, abdominal pain, anal bleeding, blood in stool, change in bowel habit, constipation, diarrhea, nausea, rectal pain, vomiting, reflux and fecal incontinence.   Genitourinary:  Negative for bladder incontinence, dysuria and hematuria.   Musculoskeletal:  Negative for arthralgias, back pain and joint swelling.   Integumentary:  Negative for color change and rash.   Allergic/Immunologic: Negative for environmental allergies and food allergies.   Neurological:  Negative for seizures and headaches.   Hematological:  Negative for adenopathy. Does not bruise/bleed easily.        Past  "Medical History:   Diagnosis Date    BMI 40.0-44.9, adult     Diabetes mellitus     High cholesterol     Hypertension     Mild intermittent asthma, uncomplicated      Past Surgical History:   Procedure Laterality Date    CHOLECYSTECTOMY      KNEE SURGERY Bilateral     bilateral     Family History   Problem Relation Age of Onset    Colon cancer Neg Hx     Liver cancer Neg Hx     Liver disease Neg Hx     Stomach cancer Neg Hx     Pancreatic cancer Neg Hx     Throat cancer Neg Hx     Esophageal cancer Neg Hx     Ulcerative colitis Neg Hx     Crohn's disease Neg Hx      Social History     Tobacco Use    Smoking status: Never    Smokeless tobacco: Never   Substance Use Topics    Alcohol use: Never    Drug use: Never     Review of patient's allergies indicates:   Allergen Reactions    Codeine Itching and Rash    Latex Rash        Medication List with Changes/Refills   Current Medications    DILTIAZEM HCL (TIAZAC) 300 MG 24 HR CAPSULE    Take 300 mg by mouth once daily.    FLUTICASONE-SALMETEROL DISKUS INHALER 250-50 MCG    Inhale 1 puff into the lungs daily as needed. Controller    GLIMEPIRIDE (AMARYL) 2 MG TABLET    Take 2 mg by mouth before breakfast.    IRBESARTAN (AVAPRO) 300 MG TABLET    Take 300 mg by mouth once daily.    L.RHAMNOSUS/B.ANIMALIS,LACTIS, ([employer] ORAL)    Take by mouth.    METFORMIN (GLUCOPHAGE) 500 MG TABLET    Take 1,000 mg by mouth before evening meal.    ROSUVASTATIN (CRESTOR) 20 MG TABLET    Take 20 mg by mouth once daily.         Vital Signs:  BP (!) 149/80   Pulse 87   Ht 5' 6" (1.676 m)   Wt 114.3 kg (252 lb)   SpO2 95%   BMI 40.67 kg/m²         Physical Exam  Vitals reviewed.   Constitutional:       General: She is awake. She is not in acute distress.     Appearance: Normal appearance. She is well-developed. She is obese. She is not ill-appearing, toxic-appearing or diaphoretic.   HENT:      Head: Normocephalic and atraumatic.      Nose: Nose normal.      Mouth/Throat:     "  Mouth: Mucous membranes are moist.      Pharynx: Oropharynx is clear. No oropharyngeal exudate or posterior oropharyngeal erythema.   Eyes:      General: Lids are normal. Gaze aligned appropriately. No scleral icterus.        Right eye: No discharge.         Left eye: No discharge.      Conjunctiva/sclera: Conjunctivae normal.   Neck:      Trachea: Trachea normal.   Cardiovascular:      Rate and Rhythm: Normal rate and regular rhythm.      Pulses:           Radial pulses are 2+ on the right side and 2+ on the left side.   Pulmonary:      Effort: Pulmonary effort is normal. No respiratory distress.      Breath sounds: No stridor. No wheezing.   Chest:      Chest wall: No tenderness.   Abdominal:      General: Bowel sounds are normal. There is no distension.      Palpations: Abdomen is soft. There is no fluid wave, hepatomegaly or mass.      Tenderness: There is no abdominal tenderness. There is no guarding or rebound.   Musculoskeletal:         General: No tenderness or deformity.      Cervical back: Full passive range of motion without pain and neck supple. No tenderness.      Right lower leg: No edema.      Left lower leg: No edema.   Lymphadenopathy:      Cervical: No cervical adenopathy.   Skin:     General: Skin is warm and dry.      Capillary Refill: Capillary refill takes less than 2 seconds.      Coloration: Skin is not cyanotic, jaundiced or pale.   Neurological:      General: No focal deficit present.      Mental Status: She is alert and oriented to person, place, and time.      Motor: No tremor.   Psychiatric:         Attention and Perception: Attention normal.         Mood and Affect: Mood and affect normal.         Speech: Speech normal.         Behavior: Behavior normal. Behavior is cooperative.            All of the data above and below has been reviewed by myself and any further interpretations will be reflected in the assessment and plan.   The data includes review of external notes, and independent  interpretation of lab results, procedures, x-rays, and imaging reports.      Assessment:  Gastroesophageal reflux disease, unspecified whether esophagitis present    Epigastric pain  -     FL Upper GI Air Contrast; Future; Expected date: 10/30/2023  -     US Abdomen Complete; Future; Expected date: 10/30/2023    Bloating  -     FL Upper GI Air Contrast; Future; Expected date: 10/30/2023  -     US Abdomen Complete; Future; Expected date: 10/30/2023    Constipation by delayed colonic transit    Declines GES and EGD. Would like UGIS and ABD US.  Also rec MiraLAX.     Recommendations:  Schedule upper GI series.   Schedule abdominal ultrasound.   Begin taking MiraLAX. Start with 1 capful daily, and titrate dose up or down until you are having daily, soft bowel movements.      Risks, benefits, and alternatives of medical management, any associated procedures, and/or treatment discussed with the patient. Patient given opportunity to ask questions and voices understanding. Patient has elected to proceed with the recommended care modalities as discussed.    Instructed patient to notify my office if they have not been contacted within two weeks after any procedures, submitting any samples (biopsies, blood, stool, urine, etc.) or after any imaging (X-ray, CT, MRI, etc.).     Follow up if symptoms worsen or fail to improve.    Order summary:  Orders Placed This Encounter   Procedures    FL Upper GI Air Contrast    US Abdomen Complete          This document may have been created using a voice recognition transcribing system. Incorrect words or phrases may have been missed during proofreading. Please interpret accordingly or contact me for clarification.     Dejon Lima MD

## 2023-10-30 NOTE — PATIENT INSTRUCTIONS
Schedule upper GI series.   Schedule abdominal ultrasound.  Begin taking MiraLAX. Start with 1 capful daily, and titrate dose up or down until you are having daily, soft bowel movements.    Please notify my office if you have not been contacted within two weeks after any procedures, submitting any samples (biopsies, blood, stool, urine, etc.) or after any imaging (X-ray, CT, MRI, etc.).

## 2023-11-02 ENCOUNTER — TELEPHONE (OUTPATIENT)
Dept: GASTROENTEROLOGY | Facility: CLINIC | Age: 68
End: 2023-11-02
Payer: COMMERCIAL

## 2023-11-02 NOTE — TELEPHONE ENCOUNTER
Patient states she is a diabetic and previously was told if she has contrast or Dye, this could cause her to have to go into dialysis. Patient states the hospital called to schedule her for the Upper GI Series and she wanted to check with our office first because she does not want to have the dye or contrast. Patient thought she was only going to have an ultrasound and then an upper gi but she was not aware that contrast or dye was involved in this process.  Please recommend. BF

## 2023-11-02 NOTE — TELEPHONE ENCOUNTER
----- Message from Malaika Cope sent at 11/2/2023  2:05 PM CDT -----  Type:  Needs Medical Advice    Who Called: Chyna Montiel  Symptoms (please be specific): -   How long has patient had these symptoms:  -  Pharmacy name and phone #:  -  Would the patient rather a call back or a response via [facility]?    Best Call Back Number: [phone]    Additional Information:  pt needs to speak w/ nurse please call   \

## 2023-11-06 NOTE — TELEPHONE ENCOUNTER
So this is contrast that just goes in the intestines-does not affect the kidneys or get into the blood stream    Per Dr. Lima:

## 2023-11-10 NOTE — TELEPHONE ENCOUNTER
She is having the U/S done but she does not want any test that has contrast or dye in it.  She wants to know if she can get a MRI or a Barium Swallow instead of the GI Series?

## 2023-11-10 NOTE — TELEPHONE ENCOUNTER
The barium swallow uses the same type of contrast as the UGIS will. It is safe for her kidneys. An MRI will have contrast. This can be harmful for her kidneys. We would recommend and UGIS.   KN

## 2023-11-13 NOTE — PROGRESS NOTES
Patient did not respond to get the UGI test done-give patient a call-document that she says no to the UGI-thanks

## 2023-11-20 NOTE — TELEPHONE ENCOUNTER
Called and spoke with patient to let her know that the barium it will not mess with her kidneys , patient was very rude to me and started talking ugly to me so she ask for  to give her a call someone with some sense so I told her I would send him a message to give her a call back .  [facility]

## 2023-11-21 ENCOUNTER — TELEPHONE (OUTPATIENT)
Dept: GASTROENTEROLOGY | Facility: CLINIC | Age: 68
End: 2023-11-21
Payer: COMMERCIAL

## 2023-11-21 NOTE — TELEPHONE ENCOUNTER
"Returned call to patient. She wanted JEANNE not Sakina. She wanted to speak to the NP who was in the room with Dr. Lima at the time of her appointment. She states she has told Dr. Lima she is not doing anything that will cause her to put something in her body. She wants to know what each test is for, and what they do. Everything we order has to be sent to the hospital in [place] because her  "can not keep taking off from his good paying job to bring her to [place] for all this testing." I spoke to Jeanne. She will call the patient. Patient is aware and states "I hope she keeps her word. If not it falls back on you." I let the patient know that I was aware of this and would make sure she is contacted by Jeanne. -KG  "

## 2023-11-21 NOTE — TELEPHONE ENCOUNTER
Spoke with patient. She does not want any radiation or contrast. But she agrees to the ultrasound and UGIS. Explained that UGIS will have oral barium but will not be harmful on her kidneys. Also explained that she will still get small amount of radiation on UGIS with Xray. She understands and now agrees. Unable to drive to [place].  Can we send orders for UGIS and ABD US to [facility]? This is ware she has her other tests done. She is aware that this week is a holiday week so may not get a call to schedule this week. Told her to notify us in 2 weeks if she has not heard anything.  VERONICA

## 2023-11-21 NOTE — TELEPHONE ENCOUNTER
Tried to call patient. No answer. Left MAYELIN. Told Sakina that ifs he calls back OK to transfer to me.  KN

## 2023-11-24 ENCOUNTER — TELEPHONE (OUTPATIENT)
Dept: GASTROENTEROLOGY | Facility: CLINIC | Age: 68
End: 2023-11-24
Payer: COMMERCIAL

## 2023-12-21 ENCOUNTER — LAB VISIT (OUTPATIENT)
Dept: LAB | Facility: HOSPITAL | Age: 68
End: 2023-12-21
Attending: FAMILY MEDICINE
Payer: COMMERCIAL

## 2023-12-21 DIAGNOSIS — E11.22 TYPE 2 DIABETES MELLITUS WITH END-STAGE RENAL DISEASE: Primary | ICD-10-CM

## 2023-12-21 DIAGNOSIS — N18.6 TYPE 2 DIABETES MELLITUS WITH END-STAGE RENAL DISEASE: Primary | ICD-10-CM

## 2023-12-21 DIAGNOSIS — E11.69 DIABETES MELLITUS ASSOCIATED WITH HORMONAL ETIOLOGY: ICD-10-CM

## 2023-12-21 LAB
ALBUMIN SERPL-MCNC: 3.9 G/DL (ref 3.4–4.8)
ALBUMIN/GLOB SERPL: 1.1 RATIO (ref 1.1–2)
ALP SERPL-CCNC: 96 UNIT/L (ref 40–150)
ALT SERPL-CCNC: 16 UNIT/L (ref 0–55)
AST SERPL-CCNC: 19 UNIT/L (ref 5–34)
BILIRUB SERPL-MCNC: 0.8 MG/DL
BUN SERPL-MCNC: 26 MG/DL (ref 9.8–20.1)
CALCIUM SERPL-MCNC: 9.9 MG/DL (ref 8.4–10.2)
CHLORIDE SERPL-SCNC: 108 MMOL/L (ref 98–107)
CHOLEST SERPL-MCNC: 148 MG/DL
CHOLEST/HDLC SERPL: 3 {RATIO} (ref 0–5)
CO2 SERPL-SCNC: 27 MMOL/L (ref 23–31)
CREAT SERPL-MCNC: 1.28 MG/DL (ref 0.55–1.02)
EST. AVERAGE GLUCOSE BLD GHB EST-MCNC: 139.9 MG/DL
GFR SERPLBLD CREATININE-BSD FMLA CKD-EPI: 46 MLS/MIN/1.73/M2
GLOBULIN SER-MCNC: 3.6 GM/DL (ref 2.4–3.5)
GLUCOSE SERPL-MCNC: 124 MG/DL (ref 82–115)
HBA1C MFR BLD: 6.5 %
HDLC SERPL-MCNC: 52 MG/DL (ref 35–60)
LDLC SERPL CALC-MCNC: 80 MG/DL (ref 50–140)
MICROALBUMIN UR-MCNC: 22.8 UG/ML
POTASSIUM SERPL-SCNC: 3.9 MMOL/L (ref 3.5–5.1)
PROT SERPL-MCNC: 7.5 GM/DL (ref 5.8–7.6)
SODIUM SERPL-SCNC: 143 MMOL/L (ref 136–145)
TRIGL SERPL-MCNC: 82 MG/DL (ref 37–140)
VLDLC SERPL CALC-MCNC: 16 MG/DL

## 2023-12-21 PROCEDURE — 83036 HEMOGLOBIN GLYCOSYLATED A1C: CPT

## 2023-12-21 PROCEDURE — 80061 LIPID PANEL: CPT

## 2023-12-21 PROCEDURE — 82043 UR ALBUMIN QUANTITATIVE: CPT

## 2023-12-21 PROCEDURE — 80053 COMPREHEN METABOLIC PANEL: CPT

## 2023-12-21 PROCEDURE — 36415 COLL VENOUS BLD VENIPUNCTURE: CPT

## 2023-12-28 DIAGNOSIS — Z12.31 ENCOUNTER FOR SCREENING MAMMOGRAM FOR MALIGNANT NEOPLASM OF BREAST: Primary | ICD-10-CM

## 2024-01-16 ENCOUNTER — HOSPITAL ENCOUNTER (EMERGENCY)
Facility: HOSPITAL | Age: 69
Discharge: HOME | End: 2024-01-16
Attending: EMERGENCY MEDICINE
Payer: COMMERCIAL

## 2024-01-16 VITALS
DIASTOLIC BLOOD PRESSURE: 98 MMHG | BODY MASS INDEX: 39.22 KG/M2 | WEIGHT: 243 LBS | HEART RATE: 83 BPM | OXYGEN SATURATION: 98 % | TEMPERATURE: 98 F | SYSTOLIC BLOOD PRESSURE: 161 MMHG | RESPIRATION RATE: 16 BRPM

## 2024-01-16 DIAGNOSIS — J30.9 ALLERGIC RHINITIS, UNSPECIFIED SEASONALITY, UNSPECIFIED TRIGGER: Primary | ICD-10-CM

## 2024-01-16 DIAGNOSIS — T14.90XA INHALATION INJURY: ICD-10-CM

## 2024-01-16 LAB
FLUAV AG UPPER RESP QL IA.RAPID: NOT DETECTED
FLUBV AG UPPER RESP QL IA.RAPID: NOT DETECTED
SARS-COV-2 RNA RESP QL NAA+PROBE: NOT DETECTED
STREP A PCR (OHS): NOT DETECTED

## 2024-01-16 PROCEDURE — 87651 STREP A DNA AMP PROBE: CPT | Performed by: NURSE PRACTITIONER

## 2024-01-16 PROCEDURE — 99284 EMERGENCY DEPT VISIT MOD MDM: CPT

## 2024-01-16 PROCEDURE — 0240U COVID/FLU A&B PCR: CPT | Performed by: NURSE PRACTITIONER

## 2024-01-16 RX ORDER — LEVOCETIRIZINE DIHYDROCHLORIDE 5 MG/1
5 TABLET, FILM COATED ORAL NIGHTLY
Qty: 30 TABLET | Refills: 0 | Status: SHIPPED | OUTPATIENT
Start: 2024-01-16 | End: 2024-02-15

## 2024-01-16 RX ORDER — BENZONATATE 100 MG/1
100 CAPSULE ORAL 3 TIMES DAILY PRN
Qty: 30 CAPSULE | Refills: 0 | Status: SHIPPED | OUTPATIENT
Start: 2024-01-16 | End: 2024-01-26

## 2024-01-16 RX ORDER — GUAIFENESIN 600 MG/1
1200 TABLET, EXTENDED RELEASE ORAL 2 TIMES DAILY
Qty: 56 TABLET | Refills: 0 | Status: SHIPPED | OUTPATIENT
Start: 2024-01-16 | End: 2024-01-30

## 2024-01-16 NOTE — ED PROVIDER NOTES
Encounter Date: 1/16/2024       History     Chief Complaint   Patient presents with    Cough     Cough, sore throat, congestion starting last night.     Patient is a 68-year-old female presents emerged department with complaints of cough, congestion, sore throat.  States that this started best she can tell if she was spraying some hair spray in her here recently.      Review of patient's allergies indicates:   Allergen Reactions    Codeine Itching and Rash    Latex Rash     Past Medical History:   Diagnosis Date    BMI 40.0-44.9, adult     Diabetes mellitus     High cholesterol     Hypertension     Mild intermittent asthma, uncomplicated      Past Surgical History:   Procedure Laterality Date    CHOLECYSTECTOMY      KNEE SURGERY Bilateral     bilateral     Family History   Problem Relation Age of Onset    Colon cancer Neg Hx     Liver cancer Neg Hx     Liver disease Neg Hx     Stomach cancer Neg Hx     Pancreatic cancer Neg Hx     Throat cancer Neg Hx     Esophageal cancer Neg Hx     Ulcerative colitis Neg Hx     Crohn's disease Neg Hx      Social History     Tobacco Use    Smoking status: Never    Smokeless tobacco: Never   Substance Use Topics    Alcohol use: Never    Drug use: Never     Review of Systems   Constitutional:  Negative for activity change, appetite change and fever.   HENT:  Positive for rhinorrhea and sore throat. Negative for congestion and dental problem.    Eyes:  Negative for discharge and itching.   Respiratory:  Positive for cough. Negative for apnea, chest tightness and shortness of breath.    Cardiovascular:  Negative for chest pain.   Gastrointestinal:  Negative for nausea.   Endocrine: Negative for cold intolerance and heat intolerance.   Genitourinary:  Negative for dysuria, vaginal bleeding, vaginal discharge and vaginal pain.   Musculoskeletal:  Negative for back pain.   Skin:  Negative for rash.   Neurological:  Negative for dizziness, facial asymmetry and weakness.   Hematological:   Does not bruise/bleed easily.   Psychiatric/Behavioral:  Negative for agitation and behavioral problems.    All other systems reviewed and are negative.      Physical Exam     Initial Vitals [01/16/24 1345]   BP Pulse Resp Temp SpO2   (!) 164/86 88 18 98.2 °F (36.8 °C) 100 %      MAP       --         Physical Exam    Nursing note and vitals reviewed.  Constitutional: Vital signs are normal. She appears well-developed and well-nourished.  Non-toxic appearance. She does not have a sickly appearance.   HENT:   Head: Normocephalic and atraumatic.   Left Ear: External ear normal.   Mouth/Throat: Oropharynx is clear and moist.   Posterior pharynx is nonedematous with no erythema or exudates.    Partial TM obstruction from cerumen.   Eyes: Conjunctivae, EOM and lids are normal. Lids are everted and swept, no foreign bodies found.   Neck: Trachea normal and phonation normal. Neck supple. No thyroid mass and no thyromegaly present.   Mild submandibular lymphadenopathy.   Normal range of motion.   Full passive range of motion without pain.     Cardiovascular:  Normal rate, regular rhythm, S1 normal, S2 normal, normal heart sounds, intact distal pulses and normal pulses.           Pulmonary/Chest: Breath sounds normal. No respiratory distress.   Abdominal: Abdomen is soft. There is no abdominal tenderness.   Musculoskeletal:      Cervical back: Full passive range of motion without pain, normal range of motion and neck supple.     Lymphadenopathy:     She has no cervical adenopathy.   Neurological: She is alert and oriented to person, place, and time. She has normal strength. GCS score is 15. GCS eye subscore is 4. GCS verbal subscore is 5. GCS motor subscore is 6.   Skin: Skin is warm, dry and intact. Capillary refill takes less than 2 seconds.   Psychiatric: She has a normal mood and affect. Her speech is normal and behavior is normal. Judgment normal. Cognition and memory are normal.         ED Course   Procedures  Labs  Reviewed   COVID/FLU A&B PCR - Normal    Narrative:     The Xpert Xpress SARS-CoV-2/FLU/RSV plus is a rapid, multiplexed real-time PCR test intended for the simultaneous qualitative detection and differentiation of SARS-CoV-2, Influenza A, Influenza B, and respiratory syncytial virus (RSV) viral RNA in either nasopharyngeal swab or nasal swab specimens.         STREP GROUP A BY PCR - Normal    Narrative:     The Xpert Xpress Strep A test is a rapid, qualitative in vitro diagnostic test for the detection of Streptococcus pyogenes (Group A ß-hemolytic Streptococcus, Strep A) in throat swab specimens from patients with signs and symptoms of pharyngitis.            Imaging Results    None          Medications - No data to display  Medical Decision Making  Patient is a 68-year-old female presents emerged department with complaints of cough, congestion, sore throat.  States that this started best she can tell if she was spraying some hair spray in her here recently.        Problems Addressed:  Allergic rhinitis, unspecified seasonality, unspecified trigger: acute illness or injury     Details: Patient has allergic response to air salt sprays.  Will give antihistamines as well as medication cough and guaifenesin for the sputum.  Patient encouraged to increase her oral intake of fluids mainly water.  Patient is afebrile and her exam is normal with the exception of some cerumen to the right external canal.  Strict ED return precautions discussed for any change or worsening symptoms including any shortness of breath or fever.  Inhalation injury: acute illness or injury     Details: Patient states these symptoms all started after spraying some aerosols hair spray or hair lines recently.  Likely reactive airway issue for this.  Will give antihistamines and makes for cough and sputum.  The patient is diabetic so I do not feel that steroids would house be appropriate for this patient is could cautions blood sugar elevation.       Amount and/or Complexity of Data Reviewed  Labs: ordered. Decision-making details documented in ED Course.    Risk  OTC drugs.  Prescription drug management.                                      Clinical Impression:  Final diagnoses:  [J30.9] Allergic rhinitis, unspecified seasonality, unspecified trigger (Primary)  [T14.90XA] Inhalation injury          ED Disposition Condition    Discharge Stable          ED Prescriptions       Medication Sig Dispense Start Date End Date Auth. Provider    levocetirizine (XYZAL) 5 MG tablet Take 1 tablet (5 mg total) by mouth every evening. 30 tablet 1/16/2024 2/15/2024 Henry Go FNP    benzonatate (TESSALON) 100 MG capsule Take 1 capsule (100 mg total) by mouth 3 (three) times daily as needed for Cough. 30 capsule 1/16/2024 1/26/2024 Henry Go FNP    guaiFENesin (MUCINEX) 600 mg 12 hr tablet Take 2 tablets (1,200 mg total) by mouth 2 (two) times daily. for 14 days 56 tablet 1/16/2024 1/30/2024 Henry Go FNP          Follow-up Information       Follow up With Specialties Details Why Contact Info    Duong Tim MD Family Medicine Schedule an appointment as soon as possible for a visit in 1 day For ER Follow Up. [address] [address]  [phone]               Henry Go FNP  01/16/24 7294

## 2024-01-16 NOTE — Clinical Note
"Chyna Delgado" Carolina Montiel was seen and treated in our emergency department on 1/16/2024.  She may return to work on 01/17/2024.       If you have any questions or concerns, please don't hesitate to call.       LPN    "

## 2024-02-12 ENCOUNTER — HOSPITAL ENCOUNTER (EMERGENCY)
Facility: HOSPITAL | Age: 69
Discharge: HOME | End: 2024-02-12
Attending: EMERGENCY MEDICINE
Payer: COMMERCIAL

## 2024-02-12 VITALS
DIASTOLIC BLOOD PRESSURE: 79 MMHG | HEART RATE: 75 BPM | HEIGHT: 66 IN | SYSTOLIC BLOOD PRESSURE: 155 MMHG | WEIGHT: 235 LBS | BODY MASS INDEX: 37.77 KG/M2 | OXYGEN SATURATION: 97 % | TEMPERATURE: 99 F | RESPIRATION RATE: 20 BRPM

## 2024-02-12 DIAGNOSIS — M94.0 COSTOCHONDRITIS: Primary | ICD-10-CM

## 2024-02-12 DIAGNOSIS — R05.9 COUGH: ICD-10-CM

## 2024-02-12 PROCEDURE — 87651 STREP A DNA AMP PROBE: CPT | Performed by: NURSE PRACTITIONER

## 2024-02-12 PROCEDURE — 99284 EMERGENCY DEPT VISIT MOD MDM: CPT | Mod: 25

## 2024-02-12 PROCEDURE — 0240U COVID/FLU A&B PCR: CPT | Performed by: NURSE PRACTITIONER

## 2024-02-12 RX ORDER — TRAMADOL HYDROCHLORIDE 50 MG/1
50 TABLET ORAL EVERY 6 HOURS PRN
Qty: 12 TABLET | Refills: 0 | Status: SHIPPED | OUTPATIENT
Start: 2024-02-12

## 2024-02-12 RX ORDER — BENZONATATE 100 MG/1
100 CAPSULE ORAL 3 TIMES DAILY PRN
Qty: 30 CAPSULE | Refills: 0 | Status: SHIPPED | OUTPATIENT
Start: 2024-02-12 | End: 2024-02-22

## 2024-02-12 RX ORDER — METHOCARBAMOL 750 MG/1
1500 TABLET, FILM COATED ORAL 3 TIMES DAILY
Qty: 30 TABLET | Refills: 0 | Status: SHIPPED | OUTPATIENT
Start: 2024-02-12 | End: 2024-02-17

## 2024-02-12 NOTE — ED PROVIDER NOTES
Encounter Date: 2/12/2024       History     Chief Complaint   Patient presents with    Cough     C/o cough x1 month. Reports that she has intermittent discomfort under the R breast radiating from the chest to shoulder and is concerned she may have pneumonia.     Patient is a 68-year-old female who presents emerged department complaints of discomfort underneath the right breast that radiates into her back.  She states she has had a cough for a month that was seen and treated for an did improve significantly but still has slight cough and reports pain starting under the right breast and radiating into the right back that started in the last few days.  She is concerned about having pneumonia.  Vital signs stable and patient has no distress with no tachypnea shortness of breath or audible wheezing.  She denies any other complaints or associated symptoms.      Review of patient's allergies indicates:   Allergen Reactions    Codeine Itching and Rash    Latex Rash     Past Medical History:   Diagnosis Date    BMI 40.0-44.9, adult     Diabetes mellitus     High cholesterol     Hypertension     Mild intermittent asthma, uncomplicated      Past Surgical History:   Procedure Laterality Date    CHOLECYSTECTOMY      KNEE SURGERY Bilateral     bilateral     Family History   Problem Relation Age of Onset    Colon cancer Neg Hx     Liver cancer Neg Hx     Liver disease Neg Hx     Stomach cancer Neg Hx     Pancreatic cancer Neg Hx     Throat cancer Neg Hx     Esophageal cancer Neg Hx     Ulcerative colitis Neg Hx     Crohn's disease Neg Hx      Social History     Tobacco Use    Smoking status: Never    Smokeless tobacco: Never   Substance Use Topics    Alcohol use: Never    Drug use: Never     Review of Systems   Constitutional:  Negative for activity change, appetite change and fever.   HENT:  Negative for congestion, dental problem and sore throat.    Eyes:  Negative for discharge and itching.   Respiratory:  Negative for apnea,  chest tightness and shortness of breath.    Cardiovascular:  Negative for chest pain.   Gastrointestinal:  Negative for abdominal distention, abdominal pain and nausea.   Genitourinary:  Negative for dysuria, vaginal bleeding, vaginal discharge and vaginal pain.   Musculoskeletal:  Positive for back pain and myalgias.   Skin:  Negative for rash.   Neurological:  Negative for dizziness, facial asymmetry and weakness.   Hematological:  Does not bruise/bleed easily.   Psychiatric/Behavioral:  Negative for agitation and behavioral problems.    All other systems reviewed and are negative.      Physical Exam     Initial Vitals [02/12/24 1523]   BP Pulse Resp Temp SpO2   (!) 163/89 81 20 98.7 °F (37.1 °C) 100 %      MAP       --         Physical Exam    Nursing note and vitals reviewed.  Constitutional: Vital signs are normal. She appears well-developed and well-nourished.  Non-toxic appearance. She does not have a sickly appearance.   HENT:   Head: Normocephalic and atraumatic.   Eyes: Conjunctivae, EOM and lids are normal. Lids are everted and swept, no foreign bodies found.   Neck: Trachea normal and phonation normal. Neck supple. No thyroid mass and no thyromegaly present.   Normal range of motion.   Full passive range of motion without pain.     Cardiovascular:  Normal rate, regular rhythm, S1 normal, S2 normal, normal heart sounds, intact distal pulses and normal pulses.           Pulmonary/Chest: Breath sounds normal. No respiratory distress. She has no wheezes. She has no rhonchi. She has no rales. She exhibits tenderness.   Tender with palpation along the ribs under the right breast that does radiate him to the right upper back.   Musculoskeletal:      Cervical back: Full passive range of motion without pain, normal range of motion and neck supple.     Lymphadenopathy:     She has no cervical adenopathy.   Neurological: She is alert and oriented to person, place, and time. She has normal strength and normal  reflexes.   Skin: Skin is warm, dry and intact. Capillary refill takes less than 2 seconds.   Psychiatric: She has a normal mood and affect. Her speech is normal and behavior is normal. Judgment normal. Cognition and memory are normal.         ED Course   Procedures  Labs Reviewed   COVID/FLU A&B PCR - Normal    Narrative:     The Xpert Xpress SARS-CoV-2/FLU/RSV plus is a rapid, multiplexed real-time PCR test intended for the simultaneous qualitative detection and differentiation of SARS-CoV-2, Influenza A, Influenza B, and respiratory syncytial virus (RSV) viral RNA in either nasopharyngeal swab or nasal swab specimens.         STREP GROUP A BY PCR - Normal    Narrative:     The Xpert Xpress Strep A test is a rapid, qualitative in vitro diagnostic test for the detection of Streptococcus pyogenes (Group A ß-hemolytic Streptococcus, Strep A) in throat swab specimens from patients with signs and symptoms of pharyngitis.            Imaging Results              X-Ray Chest 1 View (Final result)  Result time 02/12/24 16:27:25      Final result by Narciso Root MD (02/12/24 16:27:25)                   Impression:      1. No active cardiopulmonary disease identified      Electronically signed by: Narciso Root  Date:    02/12/2024  Time:    16:27               Narrative:    EXAMINATION:  XR CHEST 1 VIEW    CLINICAL HISTORY:  , Cough, unspecified.    COMPARISON:  06/09/2016    FINDINGS:  An AP view or more reveals the heart to be upper normal in size.  The trachea is midline.  Atherosclerosis seen within the aorta.  No definite infiltrate or effusion is seen.  Degenerative changes are evident at the thoracic spine.                                       Medications - No data to display  Medical Decision Making  Patient is a 68-year-old female who presents emerged department complaints of discomfort underneath the right breast that radiates into her back.  She states she has had a cough for a month that was seen and  treated for an did improve significantly but still has slight cough and reports pain starting under the right breast and radiating into the right back that started in the last few days.  She is concerned about having pneumonia.  Vital signs stable and patient has no distress with no tachypnea shortness of breath or audible wheezing.  She denies any other complaints or associated symptoms.    Problems Addressed:  Costochondritis: acute illness or injury     Details: Chest x-rays negative for any pneumonia or any acute changes.  Discussed medication for pain as well as muscle relaxers.  Discussed cough medication.  Discussed increasing oral hydration with water.  Strict ED return precautions discussed for any change or worsening symptoms.    Amount and/or Complexity of Data Reviewed  Labs: ordered.  Radiology: ordered.    Risk  Prescription drug management.               ED Course as of 02/12/24 1641   Mon Feb 12, 2024   1638 Discussed results of the chest x-ray and swabs with the patient family.  Plan at this time is give anti-inflammatories muscle relaxers medication for cough.  Did discuss follow up with Dr. Tim her primary care provider. [SL]      ED Course User Index  [SL] Henry Go FNP                           Clinical Impression:  Final diagnoses:  [R05.9] Cough  [M94.0] Costochondritis (Primary)          ED Disposition Condition    Discharge Stable          ED Prescriptions       Medication Sig Dispense Start Date End Date Auth. Provider    methocarbamoL (ROBAXIN) 750 MG Tab Take 2 tablets (1,500 mg total) by mouth 3 (three) times daily. for 5 days 30 tablet 2/12/2024 2/17/2024 Henry Go FNP    traMADoL (ULTRAM) 50 mg tablet Take 1 tablet (50 mg total) by mouth every 6 (six) hours as needed for Pain. 12 tablet 2/12/2024 -- Henry Go FNP    benzonatate (TESSALON) 100 MG capsule Take 1 capsule (100 mg total) by mouth 3 (three) times daily as needed for Cough. 30 capsule 2/12/2024  2/22/2024 Henry Go, [place]          Follow-up Information       Follow up With Specialties Details Why Contact Info    Duong Tim MD Family Medicine Schedule an appointment as soon as possible for a visit in 3 days For ER Follow Up. [address] [place] [address]  [phone]               Henry Go, [place]  02/12/24 9786

## 2024-04-05 ENCOUNTER — HOSPITAL ENCOUNTER (OUTPATIENT)
Dept: RADIOLOGY | Facility: HOSPITAL | Age: 69
Discharge: HOME | End: 2024-04-05
Attending: FAMILY MEDICINE
Payer: MEDICARE

## 2024-04-05 DIAGNOSIS — Z12.31 ENCOUNTER FOR SCREENING MAMMOGRAM FOR MALIGNANT NEOPLASM OF BREAST: ICD-10-CM

## 2024-04-22 ENCOUNTER — LAB VISIT (OUTPATIENT)
Dept: LAB | Facility: HOSPITAL | Age: 69
End: 2024-04-22
Attending: FAMILY MEDICINE
Payer: COMMERCIAL

## 2024-04-22 DIAGNOSIS — N18.6 TYPE 2 DIABETES MELLITUS WITH END-STAGE RENAL DISEASE: Primary | ICD-10-CM

## 2024-04-22 DIAGNOSIS — E11.22 TYPE 2 DIABETES MELLITUS WITH END-STAGE RENAL DISEASE: Primary | ICD-10-CM

## 2024-04-22 LAB
ANION GAP SERPL CALC-SCNC: 6 MEQ/L
BUN SERPL-MCNC: 23 MG/DL (ref 9.8–20.1)
CALCIUM SERPL-MCNC: 9.5 MG/DL (ref 8.4–10.2)
CHLORIDE SERPL-SCNC: 112 MMOL/L (ref 98–107)
CO2 SERPL-SCNC: 25 MMOL/L (ref 23–31)
CREAT SERPL-MCNC: 1.17 MG/DL (ref 0.55–1.02)
CREAT/UREA NIT SERPL: 20
EST. AVERAGE GLUCOSE BLD GHB EST-MCNC: 159.9 MG/DL
GFR SERPLBLD CREATININE-BSD FMLA CKD-EPI: 52 MLS/MIN/1.73/M2
GLUCOSE SERPL-MCNC: 141 MG/DL (ref 82–115)
HBA1C MFR BLD: 7.2 %
POTASSIUM SERPL-SCNC: 3.7 MMOL/L (ref 3.5–5.1)
SODIUM SERPL-SCNC: 143 MMOL/L (ref 136–145)

## 2024-04-22 PROCEDURE — 80048 BASIC METABOLIC PNL TOTAL CA: CPT

## 2024-04-22 PROCEDURE — 83036 HEMOGLOBIN GLYCOSYLATED A1C: CPT

## 2024-04-22 PROCEDURE — 36415 COLL VENOUS BLD VENIPUNCTURE: CPT

## 2024-05-07 DIAGNOSIS — N18.31 CKD STAGE G3A/A1, GFR 45-59 AND ALBUMIN CREATININE RATIO <30 MG/G: Primary | ICD-10-CM

## 2024-05-09 ENCOUNTER — HOSPITAL ENCOUNTER (OUTPATIENT)
Dept: RADIOLOGY | Facility: HOSPITAL | Age: 69
Discharge: HOME | End: 2024-05-09
Attending: FAMILY MEDICINE
Payer: COMMERCIAL

## 2024-05-09 PROCEDURE — 77063 BREAST TOMOSYNTHESIS BI: CPT | Mod: 26,,, | Performed by: STUDENT IN AN ORGANIZED HEALTH CARE EDUCATION/TRAINING PROGRAM

## 2024-05-09 PROCEDURE — 77067 SCR MAMMO BI INCL CAD: CPT | Mod: 26,,, | Performed by: STUDENT IN AN ORGANIZED HEALTH CARE EDUCATION/TRAINING PROGRAM

## 2024-05-09 PROCEDURE — 77063 BREAST TOMOSYNTHESIS BI: CPT | Mod: TC

## 2024-05-14 ENCOUNTER — HOSPITAL ENCOUNTER (OUTPATIENT)
Dept: RADIOLOGY | Facility: HOSPITAL | Age: 69
Discharge: HOME | End: 2024-05-14
Attending: INTERNAL MEDICINE
Payer: COMMERCIAL

## 2024-05-14 PROCEDURE — 76700 US EXAM ABDOM COMPLETE: CPT | Mod: TC

## 2024-05-14 PROCEDURE — A9698 NON-RAD CONTRAST MATERIALNOC: HCPCS | Performed by: INTERNAL MEDICINE

## 2024-05-14 PROCEDURE — 25500020 PHARM REV CODE 255: Performed by: INTERNAL MEDICINE

## 2024-05-14 PROCEDURE — 74246 X-RAY XM UPR GI TRC 2CNTRST: CPT | Mod: TC

## 2024-05-14 RX ADMIN — BARIUM SULFATE 135 ML: 980 POWDER, FOR SUSPENSION ORAL at 08:05

## 2024-06-16 ENCOUNTER — HOSPITAL ENCOUNTER (EMERGENCY)
Facility: HOSPITAL | Age: 69
Discharge: HOME | End: 2024-06-16
Attending: EMERGENCY MEDICINE
Payer: MEDICARE

## 2024-06-16 VITALS
DIASTOLIC BLOOD PRESSURE: 88 MMHG | SYSTOLIC BLOOD PRESSURE: 148 MMHG | BODY MASS INDEX: 36.8 KG/M2 | OXYGEN SATURATION: 98 % | HEIGHT: 66 IN | HEART RATE: 76 BPM | RESPIRATION RATE: 17 BRPM | WEIGHT: 229 LBS | TEMPERATURE: 98 F

## 2024-06-16 DIAGNOSIS — S39.012A BACK STRAIN, INITIAL ENCOUNTER: Primary | ICD-10-CM

## 2024-06-16 DIAGNOSIS — M94.0 COSTOCHONDRITIS, ACUTE: ICD-10-CM

## 2024-06-16 DIAGNOSIS — K29.70 GASTRITIS, PRESENCE OF BLEEDING UNSPECIFIED, UNSPECIFIED CHRONICITY, UNSPECIFIED GASTRITIS TYPE: ICD-10-CM

## 2024-06-16 PROCEDURE — 99284 EMERGENCY DEPT VISIT MOD MDM: CPT | Mod: 25

## 2024-06-16 RX ORDER — SIMETHICONE 125 MG
125 CAPSULE ORAL 4 TIMES DAILY PRN
Qty: 28 CAPSULE | Refills: 0 | Status: SHIPPED | OUTPATIENT
Start: 2024-06-16 | End: 2024-06-23

## 2024-06-16 RX ORDER — METHOCARBAMOL 750 MG/1
1500 TABLET, FILM COATED ORAL 3 TIMES DAILY
Qty: 30 TABLET | Refills: 0 | Status: SHIPPED | OUTPATIENT
Start: 2024-06-16 | End: 2024-06-21

## 2024-06-16 RX ORDER — HYDROCODONE BITARTRATE AND ACETAMINOPHEN 5; 325 MG/1; MG/1
1 TABLET ORAL EVERY 6 HOURS PRN
Qty: 12 TABLET | Refills: 0 | Status: SHIPPED | OUTPATIENT
Start: 2024-06-16

## 2024-06-16 NOTE — ED PROVIDER NOTES
Encounter Date: 6/16/2024       History     Chief Complaint   Patient presents with    Back Pain     Muscle spasms that start under R breast and wrap around to back    been going on for 6 months    PCP treating it but its not improving     Patient is a 68-year-old female who presents to the emergency department complaints of back pain pain under the right rib.  States pain underneath her rib is intermittent but improves when she massages her abdomen when lying flat states even feels like she has some gas moving when she massages her upper abdomen.  Dates back pain ongoing for months and worse with certain movements.  She denies any shortness breath or chest pain.  She denies any fevers chills.  She denies any nausea vomiting diarrhea.  She has no other complaints or associated symptoms at this time.      Review of patient's allergies indicates:   Allergen Reactions    Codeine Itching and Rash    Latex Rash     Past Medical History:   Diagnosis Date    BMI 40.0-44.9, adult     Diabetes mellitus     High cholesterol     Hypertension     Mild intermittent asthma, uncomplicated      Past Surgical History:   Procedure Laterality Date    CHOLECYSTECTOMY      KNEE SURGERY Bilateral     bilateral     Family History   Problem Relation Name Age of Onset    Colon cancer Neg Hx      Liver cancer Neg Hx      Liver disease Neg Hx      Stomach cancer Neg Hx      Pancreatic cancer Neg Hx      Throat cancer Neg Hx      Esophageal cancer Neg Hx      Ulcerative colitis Neg Hx      Crohn's disease Neg Hx       Social History     Tobacco Use    Smoking status: Never    Smokeless tobacco: Never   Substance Use Topics    Alcohol use: Never    Drug use: Never     Review of Systems   Constitutional:  Negative for activity change, appetite change and fever.   HENT:  Negative for congestion, dental problem and sore throat.    Respiratory:  Negative for apnea, chest tightness and shortness of breath.    Cardiovascular:  Negative for chest  pain.   Gastrointestinal:  Negative for abdominal distention, abdominal pain and nausea.   Genitourinary:  Negative for dysuria.   Musculoskeletal:  Positive for back pain and myalgias.   Skin:  Negative for rash.   Neurological:  Negative for dizziness, facial asymmetry and weakness.   Hematological:  Does not bruise/bleed easily.   Psychiatric/Behavioral:  Negative for agitation and behavioral problems.        Physical Exam     Initial Vitals [06/16/24 1204]   BP Pulse Resp Temp SpO2   (!) 178/85 74 16 97.8 °F (36.6 °C) 97 %      MAP       --         Physical Exam    Nursing note and vitals reviewed.  Constitutional: Vital signs are normal. She appears well-developed and well-nourished.  Non-toxic appearance. She does not have a sickly appearance.   HENT:   Head: Normocephalic and atraumatic.   Right Ear: External ear normal.   Left Ear: External ear normal.   Eyes: Conjunctivae, EOM and lids are normal. Lids are everted and swept, no foreign bodies found.   Neck: Trachea normal and phonation normal. Neck supple. No thyroid mass and no thyromegaly present.   Normal range of motion.   Full passive range of motion without pain.     Cardiovascular:  Normal rate, regular rhythm, S1 normal, S2 normal, normal heart sounds, intact distal pulses and normal pulses.           Pulmonary/Chest: Breath sounds normal. No respiratory distress.   Abdominal: Abdomen is soft. There is no abdominal tenderness.   Musculoskeletal:      Cervical back: Full passive range of motion without pain, normal range of motion and neck supple.     Lymphadenopathy:     She has no cervical adenopathy.   Neurological: She is alert and oriented to person, place, and time. She has normal strength.   Skin: Skin is warm, dry and intact. Capillary refill takes less than 2 seconds.   Psychiatric: She has a normal mood and affect. Her speech is normal and behavior is normal. Judgment normal. Cognition and memory are normal.         ED Course    Procedures  Labs Reviewed - No data to display       Imaging Results              XR Ribs Min 3 Views w/PA Chest Right (Final result)  Result time 06/16/24 14:11:40      Final result by Mitchel Gonsalez MD (06/16/24 14:11:40)                   Impression:      No acute abnormalities are seen.      Electronically signed by: Mitchel Gonsalez  Date:    06/16/2024  Time:    14:11               Narrative:    EXAMINATION:  XR RIBS MIN 3 VIEWS W/ PA CHEST RIGHT    CLINICAL HISTORY:  cough;    COMPARISON:  February 12, 2024    FINDINGS:  The cardiomediastinal silhouette remains unchanged.  Pulmonary vasculature appears unremarkable.  The lung fields appear clear.  No pleural effusion or pneumothorax are noted.    The bony structures demonstrate degenerative changes in the spine.  No acute bony fractures are seen.  In particular, no rib fractures are noted.                                       Medications - No data to display  Medical Decision Making  Patient is a 68-year-old female who presents to the emergency department complaints of back pain pain under the right rib.  States pain underneath her rib is intermittent but improves when she massages her abdomen when lying flat states even feels like she has some gas moving when she massages her upper abdomen.  Dates back pain ongoing for months and worse with certain movements.  She denies any shortness breath or chest pain.  She denies any fevers chills.  She denies any nausea vomiting diarrhea.  She has no other complaints or associated symptoms at this time.    Problems Addressed:  Costochondritis, acute: acute illness or injury     Details: Patient had x-ray of the right ribs and chest for complaints of right rib and right posterior back pain.  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  Pain on her right ribs I believe to be most likely costochondritis but there is a  component of concerned for excess gas as you do see gas and stool on x-ray of the ribs since she reports at night when she lays down she massages her after abdomen states the pain is relieved.  Will give some Ethicon as well as muscle relaxers short course of opioid pain medicine.  Discussed safe opioid use.  Discussed follow up PCP.  Strict ED return precautions discussed for any change or worsening symptoms.    Amount and/or Complexity of Data Reviewed  Radiology: ordered.    Risk  OTC drugs.  Prescription drug management.                                      Clinical Impression:  Final diagnoses:  [S39.012A] Back strain, initial encounter (Primary)  [K29.70] Gastritis, presence of bleeding unspecified, unspecified chronicity, unspecified gastritis type  [M94.0] Costochondritis, acute          ED Disposition Condition    Discharge Stable          ED Prescriptions       Medication Sig Dispense Start Date End Date Auth. Provider    simethicone (GAS RELIEF, SIMETHICONE,) 125 mg Cap capsule Take 1 capsule (125 mg total) by mouth 4 (four) times daily as needed for Flatulence. 28 capsule 6/16/2024 6/23/2024 Henry Go FNP    HYDROcodone-acetaminophen (NORCO) 5-325 mg per tablet Take 1 tablet by mouth every 6 (six) hours as needed for Pain. 12 tablet 6/16/2024 -- Henry Go FNP    methocarbamoL (ROBAXIN) 750 MG Tab Take 2 tablets (1,500 mg total) by mouth 3 (three) times daily. for 5 days 30 tablet 6/16/2024 6/21/2024 Henry Go FNP          Follow-up Information       Follow up With Specialties Details Why Contact Info    Duong Tim MD Family Medicine Schedule an appointment as soon as possible for a visit in 1 day For ER Follow Up. [address] [place] [address]  [phone]               Henry Go FNP  06/16/24 6171

## 2024-08-03 ENCOUNTER — HOSPITAL ENCOUNTER (EMERGENCY)
Facility: HOSPITAL | Age: 69
Discharge: HOME | End: 2024-08-03
Attending: GENERAL ACUTE CARE HOSPITAL
Payer: MEDICARE

## 2024-08-03 VITALS
BODY MASS INDEX: 37.28 KG/M2 | DIASTOLIC BLOOD PRESSURE: 71 MMHG | HEIGHT: 66 IN | SYSTOLIC BLOOD PRESSURE: 149 MMHG | HEART RATE: 76 BPM | TEMPERATURE: 99 F | RESPIRATION RATE: 20 BRPM | WEIGHT: 232 LBS | OXYGEN SATURATION: 96 %

## 2024-08-03 DIAGNOSIS — E86.0 DEHYDRATION: ICD-10-CM

## 2024-08-03 DIAGNOSIS — U07.1 COVID-19: Primary | ICD-10-CM

## 2024-08-03 LAB
ALBUMIN SERPL-MCNC: 3.8 G/DL (ref 3.4–4.8)
ALBUMIN/GLOB SERPL: 1.1 RATIO (ref 1.1–2)
ALP SERPL-CCNC: 91 UNIT/L (ref 40–150)
ALT SERPL-CCNC: 17 UNIT/L (ref 0–55)
AMPHET UR QL SCN: NEGATIVE
ANION GAP SERPL CALC-SCNC: 9 MEQ/L
AST SERPL-CCNC: 17 UNIT/L (ref 5–34)
BARBITURATE SCN PRESENT UR: NEGATIVE
BASOPHILS # BLD AUTO: 0.02 X10(3)/MCL
BASOPHILS NFR BLD AUTO: 0.3 %
BENZODIAZ UR QL SCN: NEGATIVE
BILIRUB SERPL-MCNC: 1 MG/DL
BILIRUB UR QL STRIP.AUTO: NEGATIVE
BUN SERPL-MCNC: 27 MG/DL (ref 9.8–20.1)
CALCIUM SERPL-MCNC: 9.9 MG/DL (ref 8.4–10.2)
CANNABINOIDS UR QL SCN: NEGATIVE
CHLORIDE SERPL-SCNC: 111 MMOL/L (ref 98–107)
CLARITY UR: CLEAR
CO2 SERPL-SCNC: 22 MMOL/L (ref 23–31)
COCAINE UR QL SCN: NEGATIVE
COLOR UR AUTO: YELLOW
CREAT SERPL-MCNC: 1.29 MG/DL (ref 0.55–1.02)
CREAT/UREA NIT SERPL: 21
EOSINOPHIL # BLD AUTO: 0.13 X10(3)/MCL (ref 0–0.9)
EOSINOPHIL NFR BLD AUTO: 1.8 %
ERYTHROCYTE [DISTWIDTH] IN BLOOD BY AUTOMATED COUNT: 14.6 % (ref 11.5–17)
FENTANYL UR QL SCN: NEGATIVE
GFR SERPLBLD CREATININE-BSD FMLA CKD-EPI: 46 ML/MIN/1.73/M2
GLOBULIN SER-MCNC: 3.4 GM/DL (ref 2.4–3.5)
GLUCOSE SERPL-MCNC: 152 MG/DL (ref 82–115)
GLUCOSE UR QL STRIP: ABNORMAL
HCT VFR BLD AUTO: 40.2 % (ref 37–47)
HGB BLD-MCNC: 12.9 G/DL (ref 12–16)
HGB UR QL STRIP: NEGATIVE
IMM GRANULOCYTES # BLD AUTO: 0.02 X10(3)/MCL (ref 0–0.04)
IMM GRANULOCYTES NFR BLD AUTO: 0.3 %
KETONES UR QL STRIP: NEGATIVE
LEUKOCYTE ESTERASE UR QL STRIP: NEGATIVE
LYMPHOCYTES # BLD AUTO: 0.3 X10(3)/MCL (ref 0.6–4.6)
LYMPHOCYTES NFR BLD AUTO: 4.2 %
MCH RBC QN AUTO: 28.9 PG (ref 27–31)
MCHC RBC AUTO-ENTMCNC: 32.1 G/DL (ref 33–36)
MCV RBC AUTO: 90.1 FL (ref 80–94)
MDMA UR QL SCN: NEGATIVE
MONOCYTES # BLD AUTO: 0.53 X10(3)/MCL (ref 0.1–1.3)
MONOCYTES NFR BLD AUTO: 7.4 %
NEUTROPHILS # BLD AUTO: 6.12 X10(3)/MCL (ref 2.1–9.2)
NEUTROPHILS NFR BLD AUTO: 86 %
NITRITE UR QL STRIP: NEGATIVE
OPIATES UR QL SCN: NEGATIVE
PCP UR QL: NEGATIVE
PH UR STRIP: 5.5 [PH]
PH UR: 5.5 [PH] (ref 3–11)
PLATELET # BLD AUTO: 220 X10(3)/MCL (ref 130–400)
PMV BLD AUTO: 9.9 FL (ref 7.4–10.4)
POTASSIUM SERPL-SCNC: 3.6 MMOL/L (ref 3.5–5.1)
PROT SERPL-MCNC: 7.2 GM/DL (ref 5.8–7.6)
PROT UR QL STRIP: NEGATIVE
RBC # BLD AUTO: 4.46 X10(6)/MCL (ref 4.2–5.4)
SODIUM SERPL-SCNC: 142 MMOL/L (ref 136–145)
SP GR UR STRIP.AUTO: 1.01 (ref 1–1.03)
SPECIFIC GRAVITY, URINE AUTO (.000) (OHS): 1.01 (ref 1–1.03)
TROPONIN I SERPL-MCNC: <0.01 NG/ML (ref 0–0.04)
TSH SERPL-ACNC: 0.84 UIU/ML (ref 0.35–4.94)
UROBILINOGEN UR STRIP-ACNC: 0.2
WBC # BLD AUTO: 7.12 X10(3)/MCL (ref 4.5–11.5)

## 2024-08-03 PROCEDURE — 63600175 PHARM REV CODE 636 W HCPCS: Performed by: GENERAL ACUTE CARE HOSPITAL

## 2024-08-03 PROCEDURE — 99284 EMERGENCY DEPT VISIT MOD MDM: CPT | Mod: 25

## 2024-08-03 PROCEDURE — 96375 TX/PRO/DX INJ NEW DRUG ADDON: CPT

## 2024-08-03 PROCEDURE — 80307 DRUG TEST PRSMV CHEM ANLYZR: CPT | Performed by: GENERAL ACUTE CARE HOSPITAL

## 2024-08-03 PROCEDURE — 85025 COMPLETE CBC W/AUTO DIFF WBC: CPT | Performed by: GENERAL ACUTE CARE HOSPITAL

## 2024-08-03 PROCEDURE — 80053 COMPREHEN METABOLIC PANEL: CPT | Performed by: GENERAL ACUTE CARE HOSPITAL

## 2024-08-03 PROCEDURE — 96361 HYDRATE IV INFUSION ADD-ON: CPT

## 2024-08-03 PROCEDURE — 96374 THER/PROPH/DIAG INJ IV PUSH: CPT

## 2024-08-03 PROCEDURE — 84443 ASSAY THYROID STIM HORMONE: CPT | Performed by: GENERAL ACUTE CARE HOSPITAL

## 2024-08-03 PROCEDURE — 84484 ASSAY OF TROPONIN QUANT: CPT | Performed by: GENERAL ACUTE CARE HOSPITAL

## 2024-08-03 PROCEDURE — 25000003 PHARM REV CODE 250: Performed by: GENERAL ACUTE CARE HOSPITAL

## 2024-08-03 PROCEDURE — 81003 URINALYSIS AUTO W/O SCOPE: CPT | Mod: 59 | Performed by: GENERAL ACUTE CARE HOSPITAL

## 2024-08-03 RX ORDER — KETOROLAC TROMETHAMINE 30 MG/ML
15 INJECTION, SOLUTION INTRAMUSCULAR; INTRAVENOUS
Status: COMPLETED | OUTPATIENT
Start: 2024-08-03 | End: 2024-08-03

## 2024-08-03 RX ORDER — ONDANSETRON 4 MG/1
4 TABLET, FILM COATED ORAL EVERY 6 HOURS
Qty: 20 TABLET | Refills: 0 | Status: SHIPPED | OUTPATIENT
Start: 2024-08-03

## 2024-08-03 RX ORDER — ONDANSETRON HYDROCHLORIDE 2 MG/ML
4 INJECTION, SOLUTION INTRAVENOUS
Status: COMPLETED | OUTPATIENT
Start: 2024-08-03 | End: 2024-08-03

## 2024-08-03 RX ADMIN — ONDANSETRON 4 MG: 2 INJECTION INTRAMUSCULAR; INTRAVENOUS at 07:08

## 2024-08-03 RX ADMIN — SODIUM CHLORIDE 1000 ML: 9 INJECTION, SOLUTION INTRAVENOUS at 07:08

## 2024-08-03 RX ADMIN — SODIUM CHLORIDE 1000 ML: 9 INJECTION, SOLUTION INTRAVENOUS at 08:08

## 2024-08-03 RX ADMIN — KETOROLAC TROMETHAMINE 15 MG: 30 INJECTION, SOLUTION INTRAMUSCULAR; INTRAVENOUS at 07:08
